# Patient Record
Sex: FEMALE | Race: WHITE | NOT HISPANIC OR LATINO | Employment: FULL TIME | URBAN - METROPOLITAN AREA
[De-identification: names, ages, dates, MRNs, and addresses within clinical notes are randomized per-mention and may not be internally consistent; named-entity substitution may affect disease eponyms.]

---

## 2023-11-28 ASSESSMENT — PROMIS GLOBAL HEALTH SCALE
RATE_GENERAL_HEALTH: GOOD
CARRYOUT_SOCIAL_ACTIVITIES: VERY GOOD
RATE_PHYSICAL_HEALTH: GOOD
RATE_AVERAGE_PAIN: 0
RATE_QUALITY_OF_LIFE: GOOD
RATE_SOCIAL_SATISFACTION: VERY GOOD
EMOTIONAL_PROBLEMS: RARELY
RATE_AVERAGE_FATIGUE: MILD
RATE_MENTAL_HEALTH: GOOD
CARRYOUT_PHYSICAL_ACTIVITIES: COMPLETELY

## 2023-12-04 ENCOUNTER — APPOINTMENT (OUTPATIENT)
Dept: PRIMARY CARE | Facility: CLINIC | Age: 28
End: 2023-12-04
Payer: COMMERCIAL

## 2023-12-04 ENCOUNTER — OFFICE VISIT (OUTPATIENT)
Dept: PRIMARY CARE | Facility: CLINIC | Age: 28
End: 2023-12-04
Payer: COMMERCIAL

## 2023-12-04 VITALS — WEIGHT: 146 LBS | TEMPERATURE: 98.6 F | SYSTOLIC BLOOD PRESSURE: 118 MMHG | DIASTOLIC BLOOD PRESSURE: 74 MMHG

## 2023-12-04 DIAGNOSIS — Z00.00 HEALTH MAINTENANCE EXAMINATION: Primary | ICD-10-CM

## 2023-12-04 DIAGNOSIS — Z91.89 AT HIGH RISK FOR BREAST CANCER: ICD-10-CM

## 2023-12-04 PROCEDURE — 99385 PREV VISIT NEW AGE 18-39: CPT | Performed by: FAMILY MEDICINE

## 2023-12-04 PROCEDURE — 1036F TOBACCO NON-USER: CPT | Performed by: FAMILY MEDICINE

## 2023-12-04 ASSESSMENT — PROMIS GLOBAL HEALTH SCALE
CARRYOUT_SOCIAL_ACTIVITIES: VERY GOOD
RATE_GENERAL_HEALTH: GOOD
RATE_AVERAGE_PAIN: 0
RATE_PHYSICAL_HEALTH: GOOD
EMOTIONAL_PROBLEMS: RARELY
RATE_QUALITY_OF_LIFE: GOOD
RATE_AVERAGE_FATIGUE: MILD
RATE_SOCIAL_SATISFACTION: VERY GOOD
CARRYOUT_PHYSICAL_ACTIVITIES: COMPLETELY
RATE_MENTAL_HEALTH: GOOD

## 2023-12-04 NOTE — PROGRESS NOTES
"Subjective   Patient ID: Xiomara Joy is a 28 y.o. female who presents for New Patient Visit (EP. Here for WWE. No pap or breast exam).  HPI  Feels well, no specific complaints or concerns today.  Change in insurance (now employed with ) so needs to establish with new physicians     Had pap smear 9/2023 ([facility])    Reports that she had bloodwork done in the Summer and \"everything was ok\"     Is a high risk breast cancer patient and reports that she is due for a MRI breast    She has a h/o fibroadenoma    FH: pGM and 2 paternal aunts with breast cancer  Review of Systems  General: no fever or night sweats, no change in weight  Eyes: no vision disturbance  ENT: no mouth lesions, no sore throat, and no dysphagia  CV: no chest pain, no palpitations  Resp: no shortness of breath, no cough  GI: no abdominal pain, no change in bowel habits  : no urinary problems  MSK: no arthralgias, myalgias, or back pain  Skin; no rashes or new/changed skin lesions  Neuro: no headaches        Objective   Visit Vitals  /74   Temp 37 °C (98.6 °F) (Oral)      Physical Exam  Alert, well-appearing.    HEENT: OP clear. Sclera white. Pupils equal and round. EACs and TMs clear.    Neck: Supple. No palpable masses. Thyroid was not enlarged.    CVS: RRR, no murmurs.     Respiratory: Normal inspirations and expirations. Clear and equal breath sounds.    GI: Soft, nontender, no masses or hepatosplenomegaly.     Assessment/Plan   Diagnoses and all orders for this visit:  Health maintenance examination  At high risk for breast cancer  -     Referral to Breast Surgery; Future      Ginny Davenport MD  Family Medicine   [facility]  "

## 2023-12-08 ENCOUNTER — PATIENT MESSAGE (OUTPATIENT)
Dept: PRIMARY CARE | Facility: CLINIC | Age: 28
End: 2023-12-08
Payer: COMMERCIAL

## 2023-12-08 DIAGNOSIS — B00.1 RECURRENT COLD SORES: Primary | ICD-10-CM

## 2023-12-08 RX ORDER — VALACYCLOVIR HYDROCHLORIDE 1 G/1
2000 TABLET, FILM COATED ORAL 2 TIMES DAILY
Qty: 28 TABLET | Refills: 0 | Status: SHIPPED | OUTPATIENT
Start: 2023-12-08 | End: 2023-12-09

## 2024-01-09 ENCOUNTER — APPOINTMENT (OUTPATIENT)
Dept: SURGICAL ONCOLOGY | Facility: HOSPITAL | Age: 29
End: 2024-01-09
Payer: COMMERCIAL

## 2024-01-11 ENCOUNTER — HOSPITAL ENCOUNTER (OUTPATIENT)
Dept: RADIOLOGY | Facility: EXTERNAL LOCATION | Age: 29
Discharge: HOME | End: 2024-01-11

## 2024-01-16 NOTE — PROGRESS NOTES
Xiomara Joy female   1995 28 y.o.   [id number]      Chief Complaint    New Patient Visit          HPI  Xiomara Joy is a 28 y.o.  female [facility] by Ginny Davenport MD [facility]. 3/2021 left breast ultrasound core biopsy at [facility] noted benign fibroadenoma.      BREAST IMAGIN2022 breast MRI, BI-RADS Category 2, bilateral masses consistent with fibroadenomas. 3/21/202 bilateral baseline mammogram, BI-RADS Category 2 bilateral probable benign breast masses.     REPRODUCTIVE HISTORY: menarche age 12, , first birth age 24,  premenopausal, extremely dense breast tissue,  one left benign biopsy with fibroadenoma    FAMILY CANCER HISTORY:   Paternal aunt (1): breast cancer  Paternal aunt (2): breast cancer  Paternal grandmother: breast cancer    REVIEW OF SYSTEMS    Constitutional:  Negative for appetite change, fatigue, fever and unexpected weight change.   HENT:  Negative for ear pain, hearing loss, nosebleeds, sore throat and trouble swallowing.    Eyes:  Negative for discharge, itching and visual disturbance.   Respiratory:  Negative for cough, chest tightness and shortness of breath.    Cardiovascular:  Negative for chest pain, palpitations and leg swelling.   Breast: as indicated in HPI  Gastrointestinal:  Negative for abdominal pain, constipation, diarrhea and nausea.   Endocrine: Negative for cold intolerance and heat intolerance.   Genitourinary:  Negative for dysuria, frequency, hematuria, pelvic pain and vaginal bleeding.   Musculoskeletal:  Negative for arthralgias, back pain, gait problem, joint swelling and myalgias.   Skin:  Negative for color change and rash.   Allergic/Immunologic: Negative for environmental allergies and food allergies.   Neurological:  Negative for dizziness, tremors, speech difficulty, weakness, numbness and headaches.   Hematological:  Does not bruise/bleed easily.   Psychiatric/Behavioral:  Negative for agitation,  dysphoric mood and sleep disturbance. The patient is not nervous/anxious.         MEDICATIONS  Current Outpatient Medications   Medication Instructions    valacyclovir HCl (VALACYCLOVIR ORAL)         ALLERGIES  No Known Allergies     No past medical history on file.   No past surgical history on file.   Family History   Problem Relation Name Age of Onset    No Known Problems Mother      No Known Problems Father      Breast cancer Father's Sister      Breast cancer Father's Sister      Breast cancer Paternal Grandmother            SOCIAL HISTORY      Social History     Tobacco Use    Smoking status: Never     Passive exposure: Never    Smokeless tobacco: Never   Substance Use Topics    Alcohol use: Not on file     Comment: socially        VITALS  Vitals:    01/19/24 0756   BP: 134/86   Pulse: 59        PHYSICAL EXAM  Patient is alert and oriented x3, with appropriate mood. The gait is steady and hand grasps are equal. Sclera clear. The breasts are nearly symmetrical. The tissue is soft   with multiple palpable mobile soft round masses bilaterally.  Right breast at 12:00 3CFN 3cmx 2.5cm, at 9:00 8CFN 1x1cm, at 8:00 4CFN 2x1cm. Left breast at 2:00 6CFN 1x1cm, 9:00 4CFN 3x2.5cm. The skin and nipples appear normal. There is no cervical, supraclavicular, or axillary lymphadenopathy palpable. Heart rate and rhythm normal, S1 and S2 appreciated. The lungs are clear bilaterally. Abdomen is soft & non-tender.    Physical Exam  Chest:              IMAGING      Time was spent viewing digital images of the radiology testing with the patient. I explained the results in depth, along with suggested explanation for follow up recommendations based on the testing results.          ASSESSMENT/PLAN  Bilateral probable benign fibroadenomas      Return age 40 for bilateral mammogram, talk to your family about about genetic testing for your father or aunts,      Sarah Hanley, APRN-[facility]

## 2024-01-19 ENCOUNTER — OFFICE VISIT (OUTPATIENT)
Dept: SURGICAL ONCOLOGY | Facility: CLINIC | Age: 29
End: 2024-01-19
Payer: COMMERCIAL

## 2024-01-19 VITALS
SYSTOLIC BLOOD PRESSURE: 134 MMHG | WEIGHT: 148 LBS | BODY MASS INDEX: 25.27 KG/M2 | DIASTOLIC BLOOD PRESSURE: 86 MMHG | HEIGHT: 64 IN | HEART RATE: 59 BPM

## 2024-01-19 DIAGNOSIS — Z91.89 AT HIGH RISK FOR BREAST CANCER: ICD-10-CM

## 2024-01-19 DIAGNOSIS — N63.20 MASSES OF BOTH BREASTS: Primary | ICD-10-CM

## 2024-01-19 DIAGNOSIS — N63.10 MASSES OF BOTH BREASTS: Primary | ICD-10-CM

## 2024-01-19 PROCEDURE — 99214 OFFICE O/P EST MOD 30 MIN: CPT | Performed by: NURSE PRACTITIONER

## 2024-01-19 PROCEDURE — 1036F TOBACCO NON-USER: CPT | Performed by: NURSE PRACTITIONER

## 2024-01-19 PROCEDURE — 99204 OFFICE O/P NEW MOD 45 MIN: CPT | Performed by: NURSE PRACTITIONER

## 2024-01-19 ASSESSMENT — PAIN SCALES - GENERAL: PAINLEVEL: 0-NO PAIN

## 2024-02-19 DIAGNOSIS — B00.1 RECURRENT COLD SORES: ICD-10-CM

## 2024-02-19 RX ORDER — VALACYCLOVIR HYDROCHLORIDE 1 G/1
2000 TABLET, FILM COATED ORAL 2 TIMES DAILY
Qty: 28 TABLET | Refills: 0 | Status: SHIPPED | OUTPATIENT
Start: 2024-02-19 | End: 2024-02-20

## 2024-03-18 ENCOUNTER — TRANSCRIBE ORDERS (OUTPATIENT)
Dept: OBSTETRICS AND GYNECOLOGY | Facility: CLINIC | Age: 29
End: 2024-03-18
Payer: COMMERCIAL

## 2024-03-18 DIAGNOSIS — N91.2 AMENORRHEA: Primary | ICD-10-CM

## 2024-04-12 ENCOUNTER — LAB (OUTPATIENT)
Dept: LAB | Facility: LAB | Age: 29
End: 2024-04-12
Payer: COMMERCIAL

## 2024-04-13 LAB — COTININE UR QL SCN: NEGATIVE

## 2024-04-15 ENCOUNTER — INITIAL PRENATAL (OUTPATIENT)
Dept: OBSTETRICS AND GYNECOLOGY | Facility: CLINIC | Age: 29
End: 2024-04-15
Payer: COMMERCIAL

## 2024-04-15 ENCOUNTER — HOSPITAL ENCOUNTER (OUTPATIENT)
Dept: RADIOLOGY | Facility: CLINIC | Age: 29
Discharge: HOME | End: 2024-04-15
Payer: COMMERCIAL

## 2024-04-15 VITALS — BODY MASS INDEX: 25.58 KG/M2 | WEIGHT: 149 LBS | SYSTOLIC BLOOD PRESSURE: 104 MMHG | DIASTOLIC BLOOD PRESSURE: 70 MMHG

## 2024-04-15 DIAGNOSIS — N91.2 AMENORRHEA: ICD-10-CM

## 2024-04-15 DIAGNOSIS — Z3A.08 8 WEEKS GESTATION OF PREGNANCY (HHS-HCC): Primary | ICD-10-CM

## 2024-04-15 PROBLEM — Z87.19 HISTORY OF CHOLESTASIS DURING PREGNANCY: Status: ACTIVE | Noted: 2024-04-15

## 2024-04-15 PROBLEM — Z87.59 HISTORY OF CHOLESTASIS DURING PREGNANCY: Status: ACTIVE | Noted: 2024-04-15

## 2024-04-15 LAB
ABO GROUP (TYPE) IN BLOOD: NORMAL
ANTIBODY SCREEN: NORMAL
ERYTHROCYTE [DISTWIDTH] IN BLOOD BY AUTOMATED COUNT: 13.2 % (ref 11.5–14.5)
HCT VFR BLD AUTO: 39.6 % (ref 36–46)
HGB BLD-MCNC: 12.7 G/DL (ref 12–16)
MCH RBC QN AUTO: 28.1 PG (ref 26–34)
MCHC RBC AUTO-ENTMCNC: 32.1 G/DL (ref 32–36)
MCV RBC AUTO: 88 FL (ref 80–100)
NRBC BLD-RTO: 0 /100 WBCS (ref 0–0)
PLATELET # BLD AUTO: 133 X10*3/UL (ref 150–450)
PREGNANCY TEST URINE, POC: POSITIVE
RBC # BLD AUTO: 4.52 X10*6/UL (ref 4–5.2)
RH FACTOR (ANTIGEN D): NORMAL
WBC # BLD AUTO: 8.2 X10*3/UL (ref 4.4–11.3)

## 2024-04-15 PROCEDURE — 76801 OB US < 14 WKS SINGLE FETUS: CPT | Performed by: OBSTETRICS & GYNECOLOGY

## 2024-04-15 PROCEDURE — 86850 RBC ANTIBODY SCREEN: CPT

## 2024-04-15 PROCEDURE — 86901 BLOOD TYPING SEROLOGIC RH(D): CPT

## 2024-04-15 PROCEDURE — 87389 HIV-1 AG W/HIV-1&-2 AB AG IA: CPT

## 2024-04-15 PROCEDURE — 36415 COLL VENOUS BLD VENIPUNCTURE: CPT

## 2024-04-15 PROCEDURE — 85027 COMPLETE CBC AUTOMATED: CPT

## 2024-04-15 PROCEDURE — 86803 HEPATITIS C AB TEST: CPT

## 2024-04-15 PROCEDURE — 87800 DETECT AGNT MULT DNA DIREC: CPT

## 2024-04-15 PROCEDURE — 86780 TREPONEMA PALLIDUM: CPT

## 2024-04-15 PROCEDURE — 88175 CYTOPATH C/V AUTO FLUID REDO: CPT

## 2024-04-15 PROCEDURE — 86900 BLOOD TYPING SEROLOGIC ABO: CPT

## 2024-04-15 PROCEDURE — 87086 URINE CULTURE/COLONY COUNT: CPT

## 2024-04-15 PROCEDURE — 0500F INITIAL PRENATAL CARE VISIT: CPT | Performed by: OBSTETRICS & GYNECOLOGY

## 2024-04-15 PROCEDURE — 81025 URINE PREGNANCY TEST: CPT | Performed by: OBSTETRICS & GYNECOLOGY

## 2024-04-15 PROCEDURE — 87340 HEPATITIS B SURFACE AG IA: CPT

## 2024-04-15 PROCEDURE — 86317 IMMUNOASSAY INFECTIOUS AGENT: CPT

## 2024-04-15 NOTE — PROGRESS NOTES
Subjective   Patient ID [id number]   Xiomara Joy is a 29 y.o.  at Unknown with a working estimated date of delivery of Not found. who presents for an initial prenatal visit. This pregnancy is planned.    Her pregnancy is complicated by:  History of cholestasis with 1st pregnancy -induced at 37 weeks    OB History    Para Term  AB Living   2 1 1     1   SAB IAB Ectopic Multiple Live Births           1      # Outcome Date GA Lbr Higinio/2nd Weight Sex Delivery Anes PTL Lv   2 Current            1 Term 19   2.722 kg F Vag-Spont   REYNOLD          Objective   Physical Exam  Weight: 67.6 kg (149 lb)  Expected Total Weight Gain: Could not be calculated   Pregravid BMI: Could not be calculated  BP: 104/70          OBGyn Exam  EGBUS normal  Lesions  Cervix no lesions    Prenatal Labs  ordered    Assessment/Plan   Problem List Items Addressed This Visit    None  Visit Diagnoses         Codes    Amenorrhea     N91.2    Relevant Orders    POCT pregnancy, urine manually resulted (Completed)    US OB < 14 weeks early    CBC Anemia Panel With Reflex,Pregnancy    Type And Screen    Hepatitis B surface antigen    Hepatitis C antibody    Syphilis Screen with Reflex    Rubella Antibody, IgG    HIV 1/2 Antigen/Antibody Screen with Reflex to Confirmation    THINPREP PAP TEST    Urine Culture    C. Trachomatis / N. Gonorrhoeae, Amplified Detection            Immunizations:   Prenatal Labs ordered  Daily prenatal vitamins prescribed  First trimester screening and second trimester screening discussed. Patient decided to schedule NIPT  D/w pt risk of recurrence of cholestasis  Follow up in 3 weeks for return OB visit.

## 2024-04-16 LAB
C TRACH RRNA SPEC QL NAA+PROBE: NEGATIVE
HBV SURFACE AG SERPL QL IA: NONREACTIVE
HCV AB SER QL: NONREACTIVE
HIV 1+2 AB+HIV1 P24 AG SERPL QL IA: NONREACTIVE
N GONORRHOEA DNA SPEC QL PROBE+SIG AMP: NEGATIVE
RUBV IGG SERPL IA-ACNC: 2.3 IA
RUBV IGG SERPL QL IA: POSITIVE
TREPONEMA PALLIDUM IGG+IGM AB [PRESENCE] IN SERUM OR PLASMA BY IMMUNOASSAY: NONREACTIVE

## 2024-04-17 LAB — BACTERIA UR CULT: NO GROWTH

## 2024-04-18 ENCOUNTER — APPOINTMENT (OUTPATIENT)
Dept: DERMATOLOGY | Facility: CLINIC | Age: 29
End: 2024-04-18
Payer: COMMERCIAL

## 2024-04-22 LAB
CYTOLOGY CMNT CVX/VAG CYTO-IMP: NORMAL
LAB AP HPV GENOTYPE QUESTION: YES
LAB AP HPV HR: NORMAL
LABORATORY COMMENT REPORT: NORMAL
LMP START DATE: NORMAL
PATH REPORT.TOTAL CANCER: NORMAL

## 2024-05-06 ENCOUNTER — ROUTINE PRENATAL (OUTPATIENT)
Dept: OBSTETRICS AND GYNECOLOGY | Facility: CLINIC | Age: 29
End: 2024-05-06
Payer: COMMERCIAL

## 2024-05-06 ENCOUNTER — HOSPITAL ENCOUNTER (OUTPATIENT)
Dept: RADIOLOGY | Facility: CLINIC | Age: 29
Discharge: HOME | End: 2024-05-06
Payer: COMMERCIAL

## 2024-05-06 VITALS — SYSTOLIC BLOOD PRESSURE: 102 MMHG | BODY MASS INDEX: 25.75 KG/M2 | WEIGHT: 150 LBS | DIASTOLIC BLOOD PRESSURE: 64 MMHG

## 2024-05-06 DIAGNOSIS — Z34.81 PRENATAL CARE, SUBSEQUENT PREGNANCY, FIRST TRIMESTER (HHS-HCC): ICD-10-CM

## 2024-05-06 DIAGNOSIS — Z3A.08 8 WEEKS GESTATION OF PREGNANCY (HHS-HCC): ICD-10-CM

## 2024-05-06 PROCEDURE — 0501F PRENATAL FLOW SHEET: CPT | Performed by: OBSTETRICS & GYNECOLOGY

## 2024-05-06 PROCEDURE — 36415 COLL VENOUS BLD VENIPUNCTURE: CPT

## 2024-05-06 PROCEDURE — 76813 OB US NUCHAL MEAS 1 GEST: CPT | Performed by: OBSTETRICS & GYNECOLOGY

## 2024-05-15 ENCOUNTER — TELEPHONE (OUTPATIENT)
Dept: OBSTETRICS AND GYNECOLOGY | Facility: CLINIC | Age: 29
End: 2024-05-15
Payer: COMMERCIAL

## 2024-05-15 NOTE — TELEPHONE ENCOUNTER
Left message notifying patient of negative [facility] Screening Result.    Gricel Alexandra MA

## 2024-06-04 ENCOUNTER — ROUTINE PRENATAL (OUTPATIENT)
Dept: OBSTETRICS AND GYNECOLOGY | Facility: CLINIC | Age: 29
End: 2024-06-04
Payer: COMMERCIAL

## 2024-06-04 VITALS — WEIGHT: 150 LBS | DIASTOLIC BLOOD PRESSURE: 60 MMHG | BODY MASS INDEX: 25.75 KG/M2 | SYSTOLIC BLOOD PRESSURE: 100 MMHG

## 2024-06-04 DIAGNOSIS — Z34.82 PRENATAL CARE, SUBSEQUENT PREGNANCY, SECOND TRIMESTER (HHS-HCC): ICD-10-CM

## 2024-06-04 PROCEDURE — 0501F PRENATAL FLOW SHEET: CPT | Performed by: OBSTETRICS & GYNECOLOGY

## 2024-06-04 NOTE — PROGRESS NOTES
Feeling good  NP eating or drinking  States she did not gain much weight with first pregnancy    Anatomy scan 4 weeks

## 2024-07-01 ENCOUNTER — APPOINTMENT (OUTPATIENT)
Dept: RADIOLOGY | Facility: CLINIC | Age: 29
End: 2024-07-01
Payer: COMMERCIAL

## 2024-07-01 ENCOUNTER — APPOINTMENT (OUTPATIENT)
Dept: OBSTETRICS AND GYNECOLOGY | Facility: CLINIC | Age: 29
End: 2024-07-01
Payer: COMMERCIAL

## 2024-07-01 VITALS — BODY MASS INDEX: 26.26 KG/M2 | WEIGHT: 153 LBS | SYSTOLIC BLOOD PRESSURE: 98 MMHG | DIASTOLIC BLOOD PRESSURE: 58 MMHG

## 2024-07-01 DIAGNOSIS — Z34.82 PRENATAL CARE, SUBSEQUENT PREGNANCY, SECOND TRIMESTER (HHS-HCC): ICD-10-CM

## 2024-07-01 PROCEDURE — 0501F PRENATAL FLOW SHEET: CPT | Performed by: OBSTETRICS & GYNECOLOGY

## 2024-07-17 ENCOUNTER — APPOINTMENT (OUTPATIENT)
Dept: DERMATOLOGY | Facility: CLINIC | Age: 29
End: 2024-07-17
Payer: COMMERCIAL

## 2024-07-30 ENCOUNTER — APPOINTMENT (OUTPATIENT)
Dept: OBSTETRICS AND GYNECOLOGY | Facility: CLINIC | Age: 29
End: 2024-07-30
Payer: COMMERCIAL

## 2024-07-30 VITALS — DIASTOLIC BLOOD PRESSURE: 58 MMHG | WEIGHT: 156 LBS | SYSTOLIC BLOOD PRESSURE: 104 MMHG | BODY MASS INDEX: 26.78 KG/M2

## 2024-07-30 DIAGNOSIS — Z34.82 PRENATAL CARE, SUBSEQUENT PREGNANCY, SECOND TRIMESTER (HHS-HCC): ICD-10-CM

## 2024-07-30 DIAGNOSIS — N76.0 ACUTE VAGINITIS: Primary | ICD-10-CM

## 2024-07-30 PROCEDURE — 87205 SMEAR GRAM STAIN: CPT

## 2024-07-30 PROCEDURE — 0501F PRENATAL FLOW SHEET: CPT | Performed by: OBSTETRICS & GYNECOLOGY

## 2024-07-30 NOTE — PROGRESS NOTES
Complains of postcoital spotting  No cramping or contractions    EGBUS normal  Vagina erythema with chunky discharge  Cervix friable, closed    Gram stain    50 gr 4 weeks

## 2024-07-31 LAB
CLUE CELLS VAG LPF-#/AREA: ABNORMAL /[LPF]
NUGENT SCORE: 1
YEAST VAG WET PREP-#/AREA: PRESENT

## 2024-08-01 DIAGNOSIS — N76.0 ACUTE VAGINITIS: Primary | ICD-10-CM

## 2024-08-01 RX ORDER — TERCONAZOLE 8 MG/G
1 CREAM VAGINAL NIGHTLY
Qty: 20 G | Refills: 0 | Status: SHIPPED | OUTPATIENT
Start: 2024-08-01 | End: 2024-08-04

## 2024-08-27 ENCOUNTER — APPOINTMENT (OUTPATIENT)
Dept: OBSTETRICS AND GYNECOLOGY | Facility: CLINIC | Age: 29
End: 2024-08-27
Payer: COMMERCIAL

## 2024-08-29 DIAGNOSIS — Z34.82 PRENATAL CARE, SUBSEQUENT PREGNANCY, SECOND TRIMESTER (HHS-HCC): Primary | ICD-10-CM

## 2024-08-30 ENCOUNTER — LAB (OUTPATIENT)
Dept: LAB | Facility: LAB | Age: 29
End: 2024-08-30
Payer: COMMERCIAL

## 2024-08-30 ENCOUNTER — ROUTINE PRENATAL (OUTPATIENT)
Dept: OBSTETRICS AND GYNECOLOGY | Facility: CLINIC | Age: 29
End: 2024-08-30
Payer: COMMERCIAL

## 2024-08-30 VITALS — DIASTOLIC BLOOD PRESSURE: 60 MMHG | WEIGHT: 160 LBS | BODY MASS INDEX: 27.46 KG/M2 | SYSTOLIC BLOOD PRESSURE: 100 MMHG

## 2024-08-30 DIAGNOSIS — Z34.83 MULTIGRAVIDA IN THIRD TRIMESTER (HHS-HCC): ICD-10-CM

## 2024-08-30 DIAGNOSIS — Z34.82 PRENATAL CARE, SUBSEQUENT PREGNANCY, SECOND TRIMESTER (HHS-HCC): ICD-10-CM

## 2024-08-30 PROCEDURE — 36415 COLL VENOUS BLD VENIPUNCTURE: CPT

## 2024-08-30 PROCEDURE — 86780 TREPONEMA PALLIDUM: CPT

## 2024-08-30 PROCEDURE — 85018 HEMOGLOBIN: CPT

## 2024-08-30 PROCEDURE — 0501F PRENATAL FLOW SHEET: CPT | Performed by: OBSTETRICS & GYNECOLOGY

## 2024-08-30 PROCEDURE — 82947 ASSAY GLUCOSE BLOOD QUANT: CPT

## 2024-08-31 LAB
GLUCOSE 1H P 50 G GLC PO SERPL-MCNC: 91 MG/DL
HGB BLD-MCNC: 10.2 G/DL (ref 12–16)
TREPONEMA PALLIDUM IGG+IGM AB [PRESENCE] IN SERUM OR PLASMA BY IMMUNOASSAY: NONREACTIVE

## 2024-09-17 ENCOUNTER — APPOINTMENT (OUTPATIENT)
Dept: OBSTETRICS AND GYNECOLOGY | Facility: CLINIC | Age: 29
End: 2024-09-17
Payer: COMMERCIAL

## 2024-09-17 VITALS — WEIGHT: 161 LBS | SYSTOLIC BLOOD PRESSURE: 106 MMHG | DIASTOLIC BLOOD PRESSURE: 56 MMHG | BODY MASS INDEX: 27.64 KG/M2

## 2024-09-17 DIAGNOSIS — Z34.83 MULTIGRAVIDA IN THIRD TRIMESTER (HHS-HCC): ICD-10-CM

## 2024-09-17 PROCEDURE — 90471 IMMUNIZATION ADMIN: CPT | Performed by: OBSTETRICS & GYNECOLOGY

## 2024-09-17 PROCEDURE — 90715 TDAP VACCINE 7 YRS/> IM: CPT | Performed by: OBSTETRICS & GYNECOLOGY

## 2024-09-17 PROCEDURE — 0501F PRENATAL FLOW SHEET: CPT | Performed by: OBSTETRICS & GYNECOLOGY

## 2024-10-01 ENCOUNTER — APPOINTMENT (OUTPATIENT)
Dept: OBSTETRICS AND GYNECOLOGY | Facility: CLINIC | Age: 29
End: 2024-10-01
Payer: COMMERCIAL

## 2024-10-01 VITALS — DIASTOLIC BLOOD PRESSURE: 60 MMHG | SYSTOLIC BLOOD PRESSURE: 102 MMHG | WEIGHT: 163.7 LBS | BODY MASS INDEX: 28.1 KG/M2

## 2024-10-01 DIAGNOSIS — Z34.83 MULTIGRAVIDA IN THIRD TRIMESTER (HHS-HCC): ICD-10-CM

## 2024-10-01 PROCEDURE — 0501F PRENATAL FLOW SHEET: CPT | Performed by: OBSTETRICS & GYNECOLOGY

## 2024-10-11 ENCOUNTER — APPOINTMENT (OUTPATIENT)
Dept: DERMATOLOGY | Facility: CLINIC | Age: 29
End: 2024-10-11
Payer: COMMERCIAL

## 2024-10-11 DIAGNOSIS — D18.01 HEMANGIOMA OF SKIN: ICD-10-CM

## 2024-10-11 DIAGNOSIS — Z12.83 ENCOUNTER FOR SCREENING FOR MALIGNANT NEOPLASM OF SKIN: Primary | ICD-10-CM

## 2024-10-11 DIAGNOSIS — L81.4 LENTIGO: ICD-10-CM

## 2024-10-11 DIAGNOSIS — D22.9 MELANOCYTIC NEVUS, UNSPECIFIED LOCATION: ICD-10-CM

## 2024-10-11 DIAGNOSIS — L70.0 ACNE VULGARIS: ICD-10-CM

## 2024-10-11 PROCEDURE — 1036F TOBACCO NON-USER: CPT | Performed by: NURSE PRACTITIONER

## 2024-10-11 PROCEDURE — 99204 OFFICE O/P NEW MOD 45 MIN: CPT | Performed by: NURSE PRACTITIONER

## 2024-10-11 RX ORDER — TRETINOIN 0.25 MG/G
CREAM TOPICAL
Qty: 45 G | Refills: 8 | Status: SHIPPED | OUTPATIENT
Start: 2024-10-11

## 2024-10-11 NOTE — PROGRESS NOTES
Subjective     Xiomara Joy is a 29 y.o. female who presents for the following: Skin Check (Presents for FBSE. Denies personal or family hx of skin cancer. Pt has lesion of concern on her back. Pt currently 34 weeks pregnant. ).     Review of Systems:  No other skin or systemic complaints other than what is documented elsewhere in the note.    The following portions of the chart were reviewed this encounter and updated as appropriate:  Tobacco  Allergies  Meds  Problems  Med Hx  Surg Hx  Fam Hx       Skin Cancer History  No skin cancer on file.    Specialty Problems    None    Past Medical History:  Xiomara Joy  has a past medical history of Cholestasis during pregnancy.    Past Surgical History:  Xiomara Joy  has a past surgical history that includes Breast biopsy (Left, 02/26/2021) and [place] tooth extraction.    Family History:  Patient family history includes Breast cancer in her father's sister, father's sister, and paternal grandmother; No Known Problems in her father and mother.    Social History:  Xiomara Joy  reports that she has never smoked. She has never been exposed to tobacco smoke. She has never used smokeless tobacco. She reports that she does not use drugs. No history on file for alcohol use.    Allergies:  Patient has no known allergies.    Current Medications / CAM's:    Current Outpatient Medications:     tretinoin (Retin-A) 0.025 % cream, Apply a pea-size amount to entire face at bedtime. Decrease use if too drying., Disp: 45 g, Rfl: 8    valacyclovir HCl (VALACYCLOVIR ORAL), , Disp: , Rfl:      Objective   Well appearing patient in no apparent distress; mood and affect are within normal limits.    A focused skin examination was performed. All findings within normal limits unless otherwise noted below.    Assessment/Plan   1. Encounter for screening for malignant neoplasm of skin  Scattered benign lesions    - Protective measures, such as avoiding skin exposure to  sunlight during peak sun hours (10 AM to 3 PM), wearing protective clothing, and applying high-SPF sunscreen, are essential for reducing exposure to harmful ultraviolet (UV) light.  - Monthly self-examination of the skin is helpful to detect new lesions or changes in existing lesions.  - Discussed signs and symptoms of sun-related skin cancers.   - Make sure your moles are not signs of skin cancer (melanoma). Remember the ABCDEs of melanoma lesions:  A - Asymmetry: One half of the lesion does not mirror the other half.  B - Border: The borders are irregular or vague (indistinct).  C - Color: More than one color may be noted within the mole.  D - Diameter: Size greater than 6 mm (roughly the size of a pencil eraser) may be concerning.  E - Evolving: Notable changes in the lesion over time are suspicious signs for skin cancer.    2. Melanocytic nevus, unspecified location  Uniform pigmented macule(s)/papule(s) with reassuring findings on dermoscopy    -Discussed nature of condition  -Reassurance, benign-appearing features on examination today  -Recommend continued observation    3. Hemangioma of skin  Violaceous/red papule with maroon lagoons     - A cherry hemangioma is a small macule (small, flat, smooth area) or papule (small, solid bump) formed from an overgrowth of tiny blood vessels in the skin. Cherry hemangiomas are characteristically red or purplish in color. They often first appear in middle adulthood and usually increase in number with age. Cherry hemangiomas are noncancerous (benign) and are common in adults.  - Lesions are benign, reassured patient.     4. Lentigo  Scattered tan macules in sun-exposed areas.    A solar lentigo (plural, solar lentigines), sometimes called an age spot or liver spot, is a brown macule (small, flat, smooth area of skin) caused by chronic sun or artificial ultraviolet (UV) light exposure. There may be just one lentigo or there may be multiple. This type of lentigo is different  from lentigo simplex (discussed separately) because it is caused by exposure to UV light. Solar lentigines are benign, but they do indicate excessive sun exposure, a risk factor for the development of skin cancer.  Lesions are benign, no treatment needed.     5. Acne vulgaris  Head - Anterior (Face)  Scattered open comedones    -Discussed the nature of the condition  -Discussed treatment options  -Recommend to begin topical retinoids; if just starting therapy with retinoid, start application of a very thin layer three nights per week as tolerated. If extreme redness or dryness occurs, hold medication until resolved and then restart at a greater interval. May apply moisturizer after application of retinoid. Advance toward nightly use as tolerated  -Recommend: Start tretinoin cream once baby is born, use as directed.   - Risks, benefits, and side effects discussed. Patient understood and agrees with the plan.       Related Medications  tretinoin (Retin-A) 0.025 % cream  Apply a pea-size amount to entire face at bedtime. Decrease use if too drying.      Follow up in 2 years for a total body skin exam. Please call me if there are any changes or development of concerning symptoms (lesion/skin condition is changing, bleeding, enlarging, or worsening).

## 2024-10-15 ENCOUNTER — LAB (OUTPATIENT)
Dept: LAB | Facility: LAB | Age: 29
End: 2024-10-15
Payer: COMMERCIAL

## 2024-10-15 ENCOUNTER — APPOINTMENT (OUTPATIENT)
Dept: OBSTETRICS AND GYNECOLOGY | Facility: CLINIC | Age: 29
End: 2024-10-15
Payer: COMMERCIAL

## 2024-10-15 VITALS — SYSTOLIC BLOOD PRESSURE: 108 MMHG | BODY MASS INDEX: 27.64 KG/M2 | DIASTOLIC BLOOD PRESSURE: 64 MMHG | WEIGHT: 161 LBS

## 2024-10-15 DIAGNOSIS — Z34.83 MULTIGRAVIDA IN THIRD TRIMESTER (HHS-HCC): ICD-10-CM

## 2024-10-15 DIAGNOSIS — N76.0 ACUTE VAGINITIS: Primary | ICD-10-CM

## 2024-10-15 DIAGNOSIS — O36.5991 POOR FETAL GROWTH AFFECTING MANAGEMENT OF MOTHER, ANTEPARTUM, FETUS 1 OF MULTIPLE GESTATION (HHS-HCC): ICD-10-CM

## 2024-10-15 DIAGNOSIS — O99.713 PRURITUS OF PREGNANCY IN THIRD TRIMESTER (HHS-HCC): ICD-10-CM

## 2024-10-15 DIAGNOSIS — L29.9 PRURITUS OF PREGNANCY IN THIRD TRIMESTER (HHS-HCC): ICD-10-CM

## 2024-10-15 PROBLEM — N63.10 MASSES OF BOTH BREASTS: Status: RESOLVED | Noted: 2024-01-19 | Resolved: 2024-10-15

## 2024-10-15 PROBLEM — N63.20 MASSES OF BOTH BREASTS: Status: RESOLVED | Noted: 2024-01-19 | Resolved: 2024-10-15

## 2024-10-15 PROCEDURE — 36415 COLL VENOUS BLD VENIPUNCTURE: CPT

## 2024-10-15 PROCEDURE — 87205 SMEAR GRAM STAIN: CPT

## 2024-10-15 PROCEDURE — 0501F PRENATAL FLOW SHEET: CPT | Performed by: OBSTETRICS & GYNECOLOGY

## 2024-10-15 PROCEDURE — 82239 BILE ACIDS TOTAL: CPT

## 2024-10-15 PROCEDURE — 87081 CULTURE SCREEN ONLY: CPT

## 2024-10-15 NOTE — PROGRESS NOTES
Complains of copious vaginal discharge  Having some itching but not as bad as when diagnosed with cholestasis    Physical exam  Vagina diffuse erythema and copious yellow-white vaginal discharge    GBS today  Bile acids  Gram stain    Ultrasound 1 week    Problem List Items Addressed This Visit    None  Visit Diagnoses       Acute vaginitis    -  Primary    Relevant Orders    Vaginitis Gram Stain For Bacterial Vaginosis + Yeast    Multigravida in third trimester ([facility])        Relevant Orders    Group B Streptococcus (GBS) Prenatal Screen, Culture    Pruritus of pregnancy in third trimester ([facility])        Relevant Orders    Bile Acids, Total    Poor fetal growth affecting management of mother, antepartum, fetus 1 of multiple gestation ([facility])        Relevant Orders    US OB 14+ weeks anatomy scan

## 2024-10-16 DIAGNOSIS — N76.0 ACUTE VAGINITIS: Primary | ICD-10-CM

## 2024-10-16 LAB
CLUE CELLS VAG LPF-#/AREA: ABNORMAL /[LPF]
NUGENT SCORE: 0
YEAST VAG WET PREP-#/AREA: PRESENT

## 2024-10-16 RX ORDER — TERCONAZOLE 8 MG/G
1 CREAM VAGINAL NIGHTLY
Qty: 20 G | Refills: 0 | Status: SHIPPED | OUTPATIENT
Start: 2024-10-16 | End: 2024-10-19

## 2024-10-17 LAB — GP B STREP GENITAL QL CULT: NORMAL

## 2024-10-18 LAB — BILE AC SERPL-SCNC: <1 UMOL/L (ref 0–10)

## 2024-10-23 RX ORDER — TERCONAZOLE 8 MG/G
CREAM VAGINAL
COMMUNITY
Start: 2024-10-16

## 2024-10-24 ENCOUNTER — APPOINTMENT (OUTPATIENT)
Dept: OBSTETRICS AND GYNECOLOGY | Facility: CLINIC | Age: 29
End: 2024-10-24
Payer: COMMERCIAL

## 2024-10-24 ENCOUNTER — HOSPITAL ENCOUNTER (OUTPATIENT)
Dept: RADIOLOGY | Facility: CLINIC | Age: 29
Discharge: HOME | End: 2024-10-24
Payer: COMMERCIAL

## 2024-10-24 VITALS — DIASTOLIC BLOOD PRESSURE: 68 MMHG | SYSTOLIC BLOOD PRESSURE: 110 MMHG | BODY MASS INDEX: 27.64 KG/M2 | WEIGHT: 161 LBS

## 2024-10-24 DIAGNOSIS — Z87.59 HISTORY OF CHOLESTASIS DURING PREGNANCY: Primary | ICD-10-CM

## 2024-10-24 DIAGNOSIS — Z34.83 MULTIGRAVIDA IN THIRD TRIMESTER (HHS-HCC): ICD-10-CM

## 2024-10-24 DIAGNOSIS — O36.5991 POOR FETAL GROWTH AFFECTING MANAGEMENT OF MOTHER, ANTEPARTUM, FETUS 1 OF MULTIPLE GESTATION (HHS-HCC): ICD-10-CM

## 2024-10-24 DIAGNOSIS — Z87.19 HISTORY OF CHOLESTASIS DURING PREGNANCY: Primary | ICD-10-CM

## 2024-10-24 PROCEDURE — 0501F PRENATAL FLOW SHEET: CPT | Performed by: OBSTETRICS & GYNECOLOGY

## 2024-10-24 PROCEDURE — 76819 FETAL BIOPHYS PROFIL W/O NST: CPT | Performed by: OBSTETRICS & GYNECOLOGY

## 2024-10-24 PROCEDURE — 76815 OB US LIMITED FETUS(S): CPT | Performed by: OBSTETRICS & GYNECOLOGY

## 2024-10-24 NOTE — PROGRESS NOTES
Problem List Items Addressed This Visit       History of cholestasis during pregnancy - Primary    Overview     Induced at 37 weeks with first pregnancy due to cholestasis  Delivered in [place]  10/15 - slight itching. Check bile acids          Other Visit Diagnoses       Multigravida in third trimester ([facility])

## 2024-10-31 ENCOUNTER — APPOINTMENT (OUTPATIENT)
Dept: OBSTETRICS AND GYNECOLOGY | Facility: CLINIC | Age: 29
End: 2024-10-31
Payer: COMMERCIAL

## 2024-10-31 VITALS — DIASTOLIC BLOOD PRESSURE: 60 MMHG | SYSTOLIC BLOOD PRESSURE: 110 MMHG | BODY MASS INDEX: 27.81 KG/M2 | WEIGHT: 162 LBS

## 2024-10-31 DIAGNOSIS — O36.5991 POOR FETAL GROWTH AFFECTING MANAGEMENT OF MOTHER, ANTEPARTUM, FETUS 1 OF MULTIPLE GESTATION (HHS-HCC): Primary | ICD-10-CM

## 2024-10-31 DIAGNOSIS — Z87.59 HISTORY OF CHOLESTASIS DURING PREGNANCY: ICD-10-CM

## 2024-10-31 DIAGNOSIS — Z34.83 MULTIGRAVIDA IN THIRD TRIMESTER (HHS-HCC): ICD-10-CM

## 2024-10-31 DIAGNOSIS — Z87.19 HISTORY OF CHOLESTASIS DURING PREGNANCY: ICD-10-CM

## 2024-10-31 LAB
SCAN RESULT: NORMAL
SCAN RESULT: NORMAL

## 2024-10-31 PROCEDURE — 0501F PRENATAL FLOW SHEET: CPT | Performed by: OBSTETRICS & GYNECOLOGY

## 2024-11-05 ENCOUNTER — APPOINTMENT (OUTPATIENT)
Dept: OBSTETRICS AND GYNECOLOGY | Facility: CLINIC | Age: 29
End: 2024-11-05
Payer: COMMERCIAL

## 2024-11-05 VITALS — SYSTOLIC BLOOD PRESSURE: 98 MMHG | DIASTOLIC BLOOD PRESSURE: 60 MMHG | WEIGHT: 163 LBS | BODY MASS INDEX: 27.98 KG/M2

## 2024-11-05 DIAGNOSIS — Z34.83 MULTIGRAVIDA IN THIRD TRIMESTER (HHS-HCC): ICD-10-CM

## 2024-11-05 DIAGNOSIS — Z87.19 HISTORY OF CHOLESTASIS DURING PREGNANCY: Primary | ICD-10-CM

## 2024-11-05 DIAGNOSIS — Z87.59 HISTORY OF CHOLESTASIS DURING PREGNANCY: Primary | ICD-10-CM

## 2024-11-05 PROCEDURE — 0501F PRENATAL FLOW SHEET: CPT | Performed by: OBSTETRICS & GYNECOLOGY

## 2024-11-05 NOTE — PROGRESS NOTES
Problem List Items Addressed This Visit       History of cholestasis during pregnancy - Primary    Overview     Induced at 37 weeks with first pregnancy due to cholestasis  Delivered in [place]  10/15 - slight itching. Check bile acids          Other Visit Diagnoses       Multigravida in third trimester ([facility])

## 2024-11-12 ENCOUNTER — LAB (OUTPATIENT)
Dept: LAB | Facility: LAB | Age: 29
End: 2024-11-12
Payer: COMMERCIAL

## 2024-11-12 ENCOUNTER — APPOINTMENT (OUTPATIENT)
Dept: OBSTETRICS AND GYNECOLOGY | Facility: CLINIC | Age: 29
End: 2024-11-12
Payer: COMMERCIAL

## 2024-11-12 VITALS — SYSTOLIC BLOOD PRESSURE: 104 MMHG | WEIGHT: 161 LBS | BODY MASS INDEX: 27.64 KG/M2 | DIASTOLIC BLOOD PRESSURE: 60 MMHG

## 2024-11-12 DIAGNOSIS — Z34.83 MULTIGRAVIDA IN THIRD TRIMESTER (HHS-HCC): ICD-10-CM

## 2024-11-12 DIAGNOSIS — Z87.19 HISTORY OF CHOLESTASIS DURING PREGNANCY: ICD-10-CM

## 2024-11-12 DIAGNOSIS — O99.713 PRURITUS OF PREGNANCY IN THIRD TRIMESTER (HHS-HCC): ICD-10-CM

## 2024-11-12 DIAGNOSIS — Z87.19 HISTORY OF CHOLESTASIS DURING PREGNANCY: Primary | ICD-10-CM

## 2024-11-12 DIAGNOSIS — L29.9 PRURITUS OF PREGNANCY IN THIRD TRIMESTER (HHS-HCC): ICD-10-CM

## 2024-11-12 DIAGNOSIS — Z87.59 HISTORY OF CHOLESTASIS DURING PREGNANCY: Primary | ICD-10-CM

## 2024-11-12 DIAGNOSIS — Z87.59 HISTORY OF CHOLESTASIS DURING PREGNANCY: ICD-10-CM

## 2024-11-12 PROCEDURE — 0501F PRENATAL FLOW SHEET: CPT | Performed by: OBSTETRICS & GYNECOLOGY

## 2024-11-12 PROCEDURE — 36415 COLL VENOUS BLD VENIPUNCTURE: CPT

## 2024-11-12 PROCEDURE — 82239 BILE ACIDS TOTAL: CPT

## 2024-11-12 NOTE — PROGRESS NOTES
Problem List Items Addressed This Visit       History of cholestasis during pregnancy - Primary    Overview     Induced at 37 weeks with first pregnancy due to cholestasis  Delivered in [place]  10/15 - slight itching. Check bile acids         Relevant Orders    Bile Acids, Total     Other Visit Diagnoses       Multigravida in third trimester ([facility])        Relevant Orders    Bile Acids, Total    Pruritus of pregnancy in third trimester ([facility])        Relevant Orders    Bile Acids, Total

## 2024-11-15 LAB — BILE AC SERPL-SCNC: 1 UMOL/L (ref 0–10)

## 2024-11-22 ENCOUNTER — APPOINTMENT (OUTPATIENT)
Dept: OBSTETRICS AND GYNECOLOGY | Facility: CLINIC | Age: 29
End: 2024-11-22
Payer: COMMERCIAL

## 2024-11-22 ENCOUNTER — APPOINTMENT (OUTPATIENT)
Dept: RADIOLOGY | Facility: CLINIC | Age: 29
End: 2024-11-22
Payer: COMMERCIAL

## 2024-11-22 VITALS — DIASTOLIC BLOOD PRESSURE: 58 MMHG | SYSTOLIC BLOOD PRESSURE: 102 MMHG | BODY MASS INDEX: 28.67 KG/M2 | WEIGHT: 167 LBS

## 2024-11-22 DIAGNOSIS — O36.5991 POOR FETAL GROWTH AFFECTING MANAGEMENT OF MOTHER, ANTEPARTUM, FETUS 1 OF MULTIPLE GESTATION (HHS-HCC): ICD-10-CM

## 2024-11-22 DIAGNOSIS — Z34.83 MULTIGRAVIDA IN THIRD TRIMESTER (HHS-HCC): ICD-10-CM

## 2024-11-22 DIAGNOSIS — O48.0 POST-TERM PREGNANCY, 40-42 WEEKS OF GESTATION (HHS-HCC): ICD-10-CM

## 2024-11-22 PROCEDURE — 76818 FETAL BIOPHYS PROFILE W/NST: CPT | Performed by: OBSTETRICS & GYNECOLOGY

## 2024-11-22 PROCEDURE — 0501F PRENATAL FLOW SHEET: CPT | Performed by: OBSTETRICS & GYNECOLOGY

## 2024-11-22 NOTE — PROGRESS NOTES
Problem List Items Addressed This Visit       Post-term pregnancy, 40-42 weeks of gestation ([facility])    Overview     Plan induction at 41W - declines membrane stripping or earlier induction

## 2024-11-22 NOTE — PROCEDURES
Xiomara Joy, a  at 40w3d with an SOLOMON of 2024, by Last Menstrual Period, was seen at [facility] for a biophysical profile with nonstress test.            Biophysical Profile  Fetal Breathin  Fetal Movement: 2  Fetal Tone: 2  Fluid Volume: 2  Biophysical Profile Score (of 8): 8 /8

## 2024-11-25 ENCOUNTER — HOSPITAL ENCOUNTER (INPATIENT)
Facility: HOSPITAL | Age: 29
LOS: 2 days | Discharge: HOME | End: 2024-11-27
Attending: OBSTETRICS & GYNECOLOGY | Admitting: ADVANCED PRACTICE MIDWIFE
Payer: COMMERCIAL

## 2024-11-25 ENCOUNTER — APPOINTMENT (OUTPATIENT)
Dept: OBSTETRICS AND GYNECOLOGY | Facility: HOSPITAL | Age: 29
End: 2024-11-25
Payer: COMMERCIAL

## 2024-11-25 DIAGNOSIS — O48.0 POST-TERM PREGNANCY, 40-42 WEEKS OF GESTATION (HHS-HCC): ICD-10-CM

## 2024-11-25 PROBLEM — B00.9 HERPES SIMPLEX VIRUS (HSV) INFECTION: Status: ACTIVE | Noted: 2024-11-25

## 2024-11-25 PROBLEM — B00.9 HSV INFECTION: Status: ACTIVE | Noted: 2024-11-25

## 2024-11-25 LAB
ABO GROUP (TYPE) IN BLOOD: NORMAL
ABO GROUP (TYPE) IN BLOOD: NORMAL
ALBUMIN SERPL BCP-MCNC: 3.6 G/DL (ref 3.4–5)
ALP SERPL-CCNC: 127 U/L (ref 33–110)
ALT SERPL W P-5'-P-CCNC: 14 U/L (ref 7–45)
ANION GAP SERPL CALC-SCNC: 13 MMOL/L (ref 10–20)
ANTIBODY SCREEN: NORMAL
AST SERPL W P-5'-P-CCNC: 19 U/L (ref 9–39)
BILIRUB SERPL-MCNC: 0.4 MG/DL (ref 0–1.2)
BUN SERPL-MCNC: 13 MG/DL (ref 6–23)
CALCIUM SERPL-MCNC: 8.9 MG/DL (ref 8.6–10.3)
CHLORIDE SERPL-SCNC: 103 MMOL/L (ref 98–107)
CO2 SERPL-SCNC: 22 MMOL/L (ref 21–32)
CREAT SERPL-MCNC: 0.62 MG/DL (ref 0.5–1.05)
EGFRCR SERPLBLD CKD-EPI 2021: >90 ML/MIN/1.73M*2
ERYTHROCYTE [DISTWIDTH] IN BLOOD BY AUTOMATED COUNT: 13.5 % (ref 11.5–14.5)
GLUCOSE SERPL-MCNC: 96 MG/DL (ref 74–99)
HCT VFR BLD AUTO: 35.9 % (ref 36–46)
HGB BLD-MCNC: 12 G/DL (ref 12–16)
MCH RBC QN AUTO: 28.8 PG (ref 26–34)
MCHC RBC AUTO-ENTMCNC: 33.4 G/DL (ref 32–36)
MCV RBC AUTO: 86 FL (ref 80–100)
NRBC BLD-RTO: 0 /100 WBCS (ref 0–0)
PLATELET # BLD AUTO: 172 X10*3/UL (ref 150–450)
POTASSIUM SERPL-SCNC: 3.9 MMOL/L (ref 3.5–5.3)
PROT SERPL-MCNC: 6 G/DL (ref 6.4–8.2)
RBC # BLD AUTO: 4.17 X10*6/UL (ref 4–5.2)
RH FACTOR (ANTIGEN D): NORMAL
RH FACTOR (ANTIGEN D): NORMAL
SODIUM SERPL-SCNC: 134 MMOL/L (ref 136–145)
WBC # BLD AUTO: 8.1 X10*3/UL (ref 4.4–11.3)

## 2024-11-25 PROCEDURE — 87081 CULTURE SCREEN ONLY: CPT | Mod: AHULAB | Performed by: ADVANCED PRACTICE MIDWIFE

## 2024-11-25 PROCEDURE — 2500000001 HC RX 250 WO HCPCS SELF ADMINISTERED DRUGS (ALT 637 FOR MEDICARE OP): Performed by: ADVANCED PRACTICE MIDWIFE

## 2024-11-25 PROCEDURE — 1120000001 HC OB PRIVATE ROOM DAILY

## 2024-11-25 PROCEDURE — 36415 COLL VENOUS BLD VENIPUNCTURE: CPT | Performed by: ADVANCED PRACTICE MIDWIFE

## 2024-11-25 PROCEDURE — 80053 COMPREHEN METABOLIC PANEL: CPT | Performed by: ADVANCED PRACTICE MIDWIFE

## 2024-11-25 PROCEDURE — 85027 COMPLETE CBC AUTOMATED: CPT | Performed by: ADVANCED PRACTICE MIDWIFE

## 2024-11-25 PROCEDURE — 86901 BLOOD TYPING SEROLOGIC RH(D): CPT | Performed by: ADVANCED PRACTICE MIDWIFE

## 2024-11-25 PROCEDURE — 86780 TREPONEMA PALLIDUM: CPT | Mod: AHULAB | Performed by: ADVANCED PRACTICE MIDWIFE

## 2024-11-25 RX ORDER — OXYTOCIN 10 [USP'U]/ML
10 INJECTION, SOLUTION INTRAMUSCULAR; INTRAVENOUS ONCE AS NEEDED
Status: COMPLETED | OUTPATIENT
Start: 2024-11-25 | End: 2024-11-26

## 2024-11-25 RX ORDER — OXYTOCIN 10 [USP'U]/ML
10 INJECTION, SOLUTION INTRAMUSCULAR; INTRAVENOUS ONCE AS NEEDED
Status: DISCONTINUED | OUTPATIENT
Start: 2024-11-25 | End: 2024-11-26

## 2024-11-25 RX ORDER — LOPERAMIDE HYDROCHLORIDE 2 MG/1
4 CAPSULE ORAL EVERY 2 HOUR PRN
Status: DISCONTINUED | OUTPATIENT
Start: 2024-11-25 | End: 2024-11-26

## 2024-11-25 RX ORDER — LABETALOL HYDROCHLORIDE 5 MG/ML
20 INJECTION, SOLUTION INTRAVENOUS ONCE AS NEEDED
Status: DISCONTINUED | OUTPATIENT
Start: 2024-11-25 | End: 2024-11-26

## 2024-11-25 RX ORDER — METOCLOPRAMIDE 10 MG/1
10 TABLET ORAL EVERY 6 HOURS PRN
Status: DISCONTINUED | OUTPATIENT
Start: 2024-11-25 | End: 2024-11-26

## 2024-11-25 RX ORDER — CARBOPROST TROMETHAMINE 250 UG/ML
250 INJECTION, SOLUTION INTRAMUSCULAR ONCE AS NEEDED
Status: DISCONTINUED | OUTPATIENT
Start: 2024-11-25 | End: 2024-11-26

## 2024-11-25 RX ORDER — LIDOCAINE HYDROCHLORIDE 10 MG/ML
30 INJECTION, SOLUTION INFILTRATION; PERINEURAL ONCE AS NEEDED
Status: DISCONTINUED | OUTPATIENT
Start: 2024-11-25 | End: 2024-11-26

## 2024-11-25 RX ORDER — OXYTOCIN/0.9 % SODIUM CHLORIDE 30/500 ML
60 PLASTIC BAG, INJECTION (ML) INTRAVENOUS ONCE AS NEEDED
Status: DISCONTINUED | OUTPATIENT
Start: 2024-11-25 | End: 2024-11-26

## 2024-11-25 RX ORDER — TERBUTALINE SULFATE 1 MG/ML
0.25 INJECTION SUBCUTANEOUS ONCE AS NEEDED
Status: DISCONTINUED | OUTPATIENT
Start: 2024-11-25 | End: 2024-11-26

## 2024-11-25 RX ORDER — VALACYCLOVIR HYDROCHLORIDE 500 MG/1
500 TABLET, FILM COATED ORAL 2 TIMES DAILY
Status: DISCONTINUED | OUTPATIENT
Start: 2024-11-25 | End: 2024-11-25

## 2024-11-25 RX ORDER — HYDRALAZINE HYDROCHLORIDE 20 MG/ML
5 INJECTION INTRAMUSCULAR; INTRAVENOUS ONCE AS NEEDED
Status: DISCONTINUED | OUTPATIENT
Start: 2024-11-25 | End: 2024-11-26

## 2024-11-25 RX ORDER — TRANEXAMIC ACID 100 MG/ML
1000 INJECTION, SOLUTION INTRAVENOUS ONCE AS NEEDED
Status: DISCONTINUED | OUTPATIENT
Start: 2024-11-25 | End: 2024-11-26

## 2024-11-25 RX ORDER — METHYLERGONOVINE MALEATE 0.2 MG/ML
0.2 INJECTION INTRAVENOUS ONCE AS NEEDED
Status: DISCONTINUED | OUTPATIENT
Start: 2024-11-25 | End: 2024-11-26

## 2024-11-25 RX ORDER — METOCLOPRAMIDE HYDROCHLORIDE 5 MG/ML
10 INJECTION INTRAMUSCULAR; INTRAVENOUS EVERY 6 HOURS PRN
Status: DISCONTINUED | OUTPATIENT
Start: 2024-11-25 | End: 2024-11-26

## 2024-11-25 RX ORDER — MISOPROSTOL 200 UG/1
800 TABLET ORAL ONCE AS NEEDED
Status: DISCONTINUED | OUTPATIENT
Start: 2024-11-25 | End: 2024-11-26

## 2024-11-25 RX ORDER — ONDANSETRON HYDROCHLORIDE 2 MG/ML
4 INJECTION, SOLUTION INTRAVENOUS EVERY 6 HOURS PRN
Status: DISCONTINUED | OUTPATIENT
Start: 2024-11-25 | End: 2024-11-26

## 2024-11-25 RX ORDER — NIFEDIPINE 10 MG/1
10 CAPSULE ORAL ONCE AS NEEDED
Status: DISCONTINUED | OUTPATIENT
Start: 2024-11-25 | End: 2024-11-26

## 2024-11-25 RX ORDER — ONDANSETRON 4 MG/1
4 TABLET, FILM COATED ORAL EVERY 6 HOURS PRN
Status: DISCONTINUED | OUTPATIENT
Start: 2024-11-25 | End: 2024-11-26

## 2024-11-25 SDOH — HEALTH STABILITY: MENTAL HEALTH: HAVE YOU USED ANY SUBSTANCES (CANABIS, COCAINE, HEROIN, HALLUCINOGENS, INHALANTS, ETC.) IN THE PAST 12 MONTHS?: NO

## 2024-11-25 SDOH — SOCIAL STABILITY: SOCIAL INSECURITY: DO YOU FEEL ANYONE HAS EXPLOITED OR TAKEN ADVANTAGE OF YOU FINANCIALLY OR OF YOUR PERSONAL PROPERTY?: NO

## 2024-11-25 SDOH — SOCIAL STABILITY: SOCIAL INSECURITY
WITHIN THE LAST YEAR, HAVE YOU BEEN KICKED, HIT, SLAPPED, OR OTHERWISE PHYSICALLY HURT BY YOUR PARTNER OR EX-PARTNER?: NO

## 2024-11-25 SDOH — SOCIAL STABILITY: SOCIAL INSECURITY: ARE THERE ANY APPARENT SIGNS OF INJURIES/BEHAVIORS THAT COULD BE RELATED TO ABUSE/NEGLECT?: NO

## 2024-11-25 SDOH — SOCIAL STABILITY: SOCIAL INSECURITY
WITHIN THE LAST YEAR, HAVE YOU BEEN RAPED OR FORCED TO HAVE ANY KIND OF SEXUAL ACTIVITY BY YOUR PARTNER OR EX-PARTNER?: NO

## 2024-11-25 SDOH — HEALTH STABILITY: MENTAL HEALTH: WISH TO BE DEAD (PAST 1 MONTH): NO

## 2024-11-25 SDOH — SOCIAL STABILITY: SOCIAL INSECURITY: HAS ANYONE EVER THREATENED TO HURT YOUR FAMILY OR YOUR PETS?: NO

## 2024-11-25 SDOH — SOCIAL STABILITY: SOCIAL INSECURITY: PHYSICAL ABUSE: DENIES

## 2024-11-25 SDOH — SOCIAL STABILITY: SOCIAL INSECURITY: ARE YOU OR HAVE YOU BEEN THREATENED OR ABUSED PHYSICALLY, EMOTIONALLY, OR SEXUALLY BY ANYONE?: NO

## 2024-11-25 SDOH — ECONOMIC STABILITY: HOUSING INSECURITY: DO YOU FEEL UNSAFE GOING BACK TO THE PLACE WHERE YOU ARE LIVING?: NO

## 2024-11-25 SDOH — ECONOMIC STABILITY: FOOD INSECURITY: WITHIN THE PAST 12 MONTHS, THE FOOD YOU BOUGHT JUST DIDN'T LAST AND YOU DIDN'T HAVE MONEY TO GET MORE.: NEVER TRUE

## 2024-11-25 SDOH — SOCIAL STABILITY: SOCIAL INSECURITY: ABUSE SCREEN: ADULT

## 2024-11-25 SDOH — ECONOMIC STABILITY: TRANSPORTATION INSECURITY: IN THE PAST 12 MONTHS, HAS LACK OF TRANSPORTATION KEPT YOU FROM MEDICAL APPOINTMENTS OR FROM GETTING MEDICATIONS?: NO

## 2024-11-25 SDOH — HEALTH STABILITY: MENTAL HEALTH: HAVE YOU USED ANY PRESCRIPTION DRUGS OTHER THAN PRESCRIBED IN THE PAST 12 MONTHS?: NO

## 2024-11-25 SDOH — SOCIAL STABILITY: SOCIAL INSECURITY: WITHIN THE LAST YEAR, HAVE YOU BEEN HUMILIATED OR EMOTIONALLY ABUSED IN OTHER WAYS BY YOUR PARTNER OR EX-PARTNER?: NO

## 2024-11-25 SDOH — HEALTH STABILITY: MENTAL HEALTH: NON-SPECIFIC ACTIVE SUICIDAL THOUGHTS (PAST 1 MONTH): NO

## 2024-11-25 SDOH — SOCIAL STABILITY: SOCIAL INSECURITY: WITHIN THE LAST YEAR, HAVE YOU BEEN AFRAID OF YOUR PARTNER OR EX-PARTNER?: NO

## 2024-11-25 SDOH — SOCIAL STABILITY: SOCIAL INSECURITY: HAVE YOU HAD THOUGHTS OF HARMING ANYONE ELSE?: NO

## 2024-11-25 SDOH — SOCIAL STABILITY: SOCIAL INSECURITY: HAVE YOU HAD ANY THOUGHTS OF HARMING ANYONE ELSE?: NO

## 2024-11-25 SDOH — ECONOMIC STABILITY: FOOD INSECURITY: WITHIN THE PAST 12 MONTHS, YOU WORRIED THAT YOUR FOOD WOULD RUN OUT BEFORE YOU GOT THE MONEY TO BUY MORE.: NEVER TRUE

## 2024-11-25 SDOH — ECONOMIC STABILITY: FOOD INSECURITY: HOW HARD IS IT FOR YOU TO PAY FOR THE VERY BASICS LIKE FOOD, HOUSING, MEDICAL CARE, AND HEATING?: NOT HARD AT ALL

## 2024-11-25 SDOH — SOCIAL STABILITY: SOCIAL INSECURITY: DOES ANYONE TRY TO KEEP YOU FROM HAVING/CONTACTING OTHER FRIENDS OR DOING THINGS OUTSIDE YOUR HOME?: NO

## 2024-11-25 SDOH — HEALTH STABILITY: MENTAL HEALTH: SUICIDAL BEHAVIOR (LIFETIME): NO

## 2024-11-25 SDOH — SOCIAL STABILITY: SOCIAL INSECURITY: VERBAL ABUSE: DENIES

## 2024-11-25 SDOH — HEALTH STABILITY: MENTAL HEALTH: WERE YOU ABLE TO COMPLETE ALL THE BEHAVIORAL HEALTH SCREENINGS?: YES

## 2024-11-25 ASSESSMENT — LIFESTYLE VARIABLES
HOW MANY STANDARD DRINKS CONTAINING ALCOHOL DO YOU HAVE ON A TYPICAL DAY: PATIENT DOES NOT DRINK
AUDIT-C TOTAL SCORE: 0
SKIP TO QUESTIONS 9-10: 1
HOW OFTEN DO YOU HAVE A DRINK CONTAINING ALCOHOL: NEVER
HOW OFTEN DO YOU HAVE 6 OR MORE DRINKS ON ONE OCCASION: NEVER
AUDIT-C TOTAL SCORE: 0

## 2024-11-25 ASSESSMENT — PAIN SCALES - GENERAL
PAINLEVEL_OUTOF10: 0 - NO PAIN

## 2024-11-25 ASSESSMENT — ACTIVITIES OF DAILY LIVING (ADL): LACK_OF_TRANSPORTATION: NO

## 2024-11-25 ASSESSMENT — PATIENT HEALTH QUESTIONNAIRE - PHQ9
1. LITTLE INTEREST OR PLEASURE IN DOING THINGS: NOT AT ALL
2. FEELING DOWN, DEPRESSED OR HOPELESS: NOT AT ALL
SUM OF ALL RESPONSES TO PHQ9 QUESTIONS 1 & 2: 0

## 2024-11-26 ENCOUNTER — ANESTHESIA EVENT (OUTPATIENT)
Dept: OBSTETRICS AND GYNECOLOGY | Facility: HOSPITAL | Age: 29
End: 2024-11-26
Payer: COMMERCIAL

## 2024-11-26 ENCOUNTER — ANESTHESIA (OUTPATIENT)
Dept: OBSTETRICS AND GYNECOLOGY | Facility: HOSPITAL | Age: 29
End: 2024-11-26
Payer: COMMERCIAL

## 2024-11-26 LAB — TREPONEMA PALLIDUM IGG+IGM AB [PRESENCE] IN SERUM OR PLASMA BY IMMUNOASSAY: NONREACTIVE

## 2024-11-26 PROCEDURE — 7210000002 HC LABOR PER HOUR

## 2024-11-26 PROCEDURE — 2500000001 HC RX 250 WO HCPCS SELF ADMINISTERED DRUGS (ALT 637 FOR MEDICARE OP): Performed by: ADVANCED PRACTICE MIDWIFE

## 2024-11-26 PROCEDURE — 3700000014 EPIDURAL BLOCK: Performed by: NURSE ANESTHETIST, CERTIFIED REGISTERED

## 2024-11-26 PROCEDURE — 10907ZC DRAINAGE OF AMNIOTIC FLUID, THERAPEUTIC FROM PRODUCTS OF CONCEPTION, VIA NATURAL OR ARTIFICIAL OPENING: ICD-10-PCS | Performed by: ADVANCED PRACTICE MIDWIFE

## 2024-11-26 PROCEDURE — 3E0P7VZ INTRODUCTION OF HORMONE INTO FEMALE REPRODUCTIVE, VIA NATURAL OR ARTIFICIAL OPENING: ICD-10-PCS | Performed by: ADVANCED PRACTICE MIDWIFE

## 2024-11-26 PROCEDURE — 59400 OBSTETRICAL CARE: CPT | Performed by: ADVANCED PRACTICE MIDWIFE

## 2024-11-26 PROCEDURE — 1100000001 HC PRIVATE ROOM DAILY

## 2024-11-26 PROCEDURE — 2500000004 HC RX 250 GENERAL PHARMACY W/ HCPCS (ALT 636 FOR OP/ED): Performed by: ADVANCED PRACTICE MIDWIFE

## 2024-11-26 PROCEDURE — 59050 FETAL MONITOR W/REPORT: CPT

## 2024-11-26 PROCEDURE — 7100000016 HC LABOR RECOVERY PER HOUR

## 2024-11-26 PROCEDURE — 59409 OBSTETRICAL CARE: CPT | Performed by: ADVANCED PRACTICE MIDWIFE

## 2024-11-26 PROCEDURE — 2500000004 HC RX 250 GENERAL PHARMACY W/ HCPCS (ALT 636 FOR OP/ED): Performed by: NURSE ANESTHETIST, CERTIFIED REGISTERED

## 2024-11-26 RX ORDER — NIFEDIPINE 10 MG/1
10 CAPSULE ORAL ONCE AS NEEDED
Status: DISCONTINUED | OUTPATIENT
Start: 2024-11-26 | End: 2024-11-27 | Stop reason: HOSPADM

## 2024-11-26 RX ORDER — DIPHENHYDRAMINE HCL 25 MG
25 CAPSULE ORAL EVERY 6 HOURS PRN
Status: DISCONTINUED | OUTPATIENT
Start: 2024-11-26 | End: 2024-11-27 | Stop reason: HOSPADM

## 2024-11-26 RX ORDER — OXYTOCIN 10 [USP'U]/ML
10 INJECTION, SOLUTION INTRAMUSCULAR; INTRAVENOUS ONCE AS NEEDED
Status: DISCONTINUED | OUTPATIENT
Start: 2024-11-26 | End: 2024-11-27 | Stop reason: HOSPADM

## 2024-11-26 RX ORDER — POLYETHYLENE GLYCOL 3350 17 G/17G
17 POWDER, FOR SOLUTION ORAL 2 TIMES DAILY PRN
Status: DISCONTINUED | OUTPATIENT
Start: 2024-11-26 | End: 2024-11-27 | Stop reason: HOSPADM

## 2024-11-26 RX ORDER — FENTANYL/ROPIVACAINE/NS/PF 2MCG/ML-.2
0-25 PLASTIC BAG, INJECTION (ML) INJECTION CONTINUOUS
Status: DISCONTINUED | OUTPATIENT
Start: 2024-11-26 | End: 2024-11-26

## 2024-11-26 RX ORDER — OXYTOCIN/0.9 % SODIUM CHLORIDE 30/500 ML
60 PLASTIC BAG, INJECTION (ML) INTRAVENOUS ONCE AS NEEDED
Status: DISCONTINUED | OUTPATIENT
Start: 2024-11-26 | End: 2024-11-27 | Stop reason: HOSPADM

## 2024-11-26 RX ORDER — SIMETHICONE 80 MG
80 TABLET,CHEWABLE ORAL 4 TIMES DAILY PRN
Status: DISCONTINUED | OUTPATIENT
Start: 2024-11-26 | End: 2024-11-27 | Stop reason: HOSPADM

## 2024-11-26 RX ORDER — LIDOCAINE 560 MG/1
1 PATCH PERCUTANEOUS; TOPICAL; TRANSDERMAL
Status: DISCONTINUED | OUTPATIENT
Start: 2024-11-26 | End: 2024-11-27 | Stop reason: HOSPADM

## 2024-11-26 RX ORDER — ADHESIVE BANDAGE
10 BANDAGE TOPICAL
Status: DISCONTINUED | OUTPATIENT
Start: 2024-11-26 | End: 2024-11-27 | Stop reason: HOSPADM

## 2024-11-26 RX ORDER — ACETAMINOPHEN 325 MG/1
975 TABLET ORAL EVERY 6 HOURS
Status: DISCONTINUED | OUTPATIENT
Start: 2024-11-26 | End: 2024-11-27 | Stop reason: HOSPADM

## 2024-11-26 RX ORDER — BISACODYL 10 MG/1
10 SUPPOSITORY RECTAL DAILY PRN
Status: DISCONTINUED | OUTPATIENT
Start: 2024-11-26 | End: 2024-11-27 | Stop reason: HOSPADM

## 2024-11-26 RX ORDER — ONDANSETRON HYDROCHLORIDE 2 MG/ML
4 INJECTION, SOLUTION INTRAVENOUS EVERY 6 HOURS PRN
Status: DISCONTINUED | OUTPATIENT
Start: 2024-11-26 | End: 2024-11-27 | Stop reason: HOSPADM

## 2024-11-26 RX ORDER — ONDANSETRON 4 MG/1
4 TABLET, FILM COATED ORAL EVERY 6 HOURS PRN
Status: DISCONTINUED | OUTPATIENT
Start: 2024-11-26 | End: 2024-11-27 | Stop reason: HOSPADM

## 2024-11-26 RX ORDER — HYDRALAZINE HYDROCHLORIDE 20 MG/ML
5 INJECTION INTRAMUSCULAR; INTRAVENOUS ONCE AS NEEDED
Status: DISCONTINUED | OUTPATIENT
Start: 2024-11-26 | End: 2024-11-27 | Stop reason: HOSPADM

## 2024-11-26 RX ORDER — NALBUPHINE HYDROCHLORIDE 10 MG/ML
10 INJECTION INTRAMUSCULAR; INTRAVENOUS; SUBCUTANEOUS ONCE
Status: COMPLETED | OUTPATIENT
Start: 2024-11-26 | End: 2024-11-26

## 2024-11-26 RX ORDER — METHYLERGONOVINE MALEATE 0.2 MG/ML
0.2 INJECTION INTRAVENOUS ONCE AS NEEDED
Status: DISCONTINUED | OUTPATIENT
Start: 2024-11-26 | End: 2024-11-27 | Stop reason: HOSPADM

## 2024-11-26 RX ORDER — LOPERAMIDE HYDROCHLORIDE 2 MG/1
4 CAPSULE ORAL EVERY 2 HOUR PRN
Status: DISCONTINUED | OUTPATIENT
Start: 2024-11-26 | End: 2024-11-27 | Stop reason: HOSPADM

## 2024-11-26 RX ORDER — TRANEXAMIC ACID 100 MG/ML
1000 INJECTION, SOLUTION INTRAVENOUS ONCE AS NEEDED
Status: DISCONTINUED | OUTPATIENT
Start: 2024-11-26 | End: 2024-11-27 | Stop reason: HOSPADM

## 2024-11-26 RX ORDER — CARBOPROST TROMETHAMINE 250 UG/ML
250 INJECTION, SOLUTION INTRAMUSCULAR ONCE AS NEEDED
Status: DISCONTINUED | OUTPATIENT
Start: 2024-11-26 | End: 2024-11-27 | Stop reason: HOSPADM

## 2024-11-26 RX ORDER — IBUPROFEN 600 MG/1
600 TABLET ORAL EVERY 6 HOURS
Status: DISCONTINUED | OUTPATIENT
Start: 2024-11-26 | End: 2024-11-27 | Stop reason: HOSPADM

## 2024-11-26 RX ORDER — DIPHENHYDRAMINE HYDROCHLORIDE 50 MG/ML
25 INJECTION INTRAMUSCULAR; INTRAVENOUS EVERY 6 HOURS PRN
Status: DISCONTINUED | OUTPATIENT
Start: 2024-11-26 | End: 2024-11-27 | Stop reason: HOSPADM

## 2024-11-26 RX ORDER — LABETALOL HYDROCHLORIDE 5 MG/ML
20 INJECTION, SOLUTION INTRAVENOUS ONCE AS NEEDED
Status: DISCONTINUED | OUTPATIENT
Start: 2024-11-26 | End: 2024-11-27 | Stop reason: HOSPADM

## 2024-11-26 RX ORDER — MISOPROSTOL 200 UG/1
800 TABLET ORAL ONCE AS NEEDED
Status: DISCONTINUED | OUTPATIENT
Start: 2024-11-26 | End: 2024-11-27 | Stop reason: HOSPADM

## 2024-11-26 SDOH — HEALTH STABILITY: MENTAL HEALTH: CURRENT SMOKER: 0

## 2024-11-26 ASSESSMENT — PAIN SCALES - GENERAL
PAINLEVEL_OUTOF10: 5 - MODERATE PAIN
PAINLEVEL_OUTOF10: 0 - NO PAIN
PAINLEVEL_OUTOF10: 5 - MODERATE PAIN
PAINLEVEL_OUTOF10: 0 - NO PAIN
PAINLEVEL_OUTOF10: 5 - MODERATE PAIN
PAINLEVEL_OUTOF10: 0 - NO PAIN
PAIN_LEVEL: 1
PAINLEVEL_OUTOF10: 0 - NO PAIN

## 2024-11-26 NOTE — SIGNIFICANT EVENT
[facility] LABOR PROGRESS NOTE  SUBJECTIVE:    Patient is coping well.  Reports she is now comfortable with epidural.    OBJECTIVE:   Visit Vitals  BP 93/53   Pulse 69   Temp 36.6 °C (97.9 °F) (Temporal)   Resp 16       FHR:      Baseline: 140  Variability: minimal   Accels: absent  Decels: none    Wireless Jenny monitor apparently no longer reading accurately, will convert to standard us EFM/toco                                      Contraction Frequency:  irregular, every 2-4 minutes, adequate cervical change    Cervical Exam:  7cm/100%/0    Membranes:  intact    Pitocin:  No     ASSESSMENT:  Xiomara Joy is a 29 y.o.  at 41w0d  Active Labor  Category II FHR, overall reassuring with now moderate variability  GBS unknown, ( neg 10/15)  Membranes intact  Difficulty with wireless EFM    PLAN:  Labor mgmt: Continue expectant mgmt at this time.  Discussed r/b/a pitocin per protocol if ctxs space, pt understands and agrees, will defer at this time.  Clear liquids  Frequent position changes  Continuous fetal monitoring with wireless monitor  Reassess as indicated  Anticipate SVB    Electronically signed by DIONICIO Marquez on 2024 at 4:40 AM

## 2024-11-26 NOTE — ANESTHESIA POSTPROCEDURE EVALUATION
Patient: Xiomara Joy    Procedure Summary       Date: 11/26/24 Room / Location:     Anesthesia Start: 0331 Anesthesia Stop: 0514    Procedure: Labor Analgesia Diagnosis:     Scheduled Providers:  Responsible Provider: HILLARY Espinal    Anesthesia Type: epidural ASA Status: 2            Anesthesia Type: epidural    Vitals Value Taken Time   BP 93/50 11/26/24 0558   Temp 98.2 11/26/24 0612   Pulse 60 11/26/24 0610   Resp 18 11/26/24 0612   SpO2 99 % 11/26/24 0610       Anesthesia Post Evaluation    Patient location during evaluation: bedside  Patient participation: complete - patient participated  Level of consciousness: awake and alert  Pain score: 1  Pain management: adequate  Multimodal analgesia pain management approach  Airway patency: patent  Cardiovascular status: acceptable  Respiratory status: acceptable  Hydration status: acceptable  Postoperative Nausea and Vomiting: none      No notable events documented.

## 2024-11-26 NOTE — L&D DELIVERY NOTE
OB Delivery Note  2024  Xiomara Joy  29 y.o.   Vaginal, Spontaneous     Delivery Details:  Xiomara Joy, a 29 y.o.  female was found to be complete and had deep variable decels with ctxs.  Attempts at pushing were productive.  Dr. Soria notified in case of need for vacuum assisted delivery.  Moderate variability with return to baseline when pt repositioned between ctxs.  With great progress of descent with pushing efforts, she delivered a viable Female infant with Apgars of 7  and 9 . The patient was put in the dorsal lithotomy position.  Hands-on perineum at  with controlled extension of fetal head. A tight nuchal cord was noted and reduced.  Shoulders delivered swiftly following restitution. Delivery was via Vaginal, Spontaneous to a sterile field under Epidural anesthesia. Infant delivered in Vertex Right Occiput Anterior position. Postpartum pitocin administered IM secondary to national IV fluid shortage. Vigorous infant placed on maternal abdomen for skin to skin. Cord cut by FOB after delayed cord clamping. Placenta delivered spontaneously and in tact with gentle traction on umbilical cord.  Trailing membranes were noted which were gently teased out with ring forceps and gently manually removed from lower uterine segment.  Cord blood was obtained in routine fashion. Cord complications were:  nuchal x 1, no other complications noted.  Fundal massage performed. Fundus palpated firm, midline, and below umbilicus. Perineum, vagina, cervix were inspected, and found to be intact.  Excellent hemostasis was noted and fundus remains firm. Needle count correct. Infant and patient in delivery room in good and stable condition.       Gestational Age: 41w0d  /Para:   Quantitative Blood Loss: Admission to Discharge: 0 mL (2024  7:48 PM - 2024  6:19 AM)    Oly Joy [[id number]]      Labor Events    Rupture date/time: 2024 0449  Rupture type:  Artificial  Fluid color: Clear  Labor type: Induced Onset of Labor  Labor allowed to proceed with plans for an attempted vaginal birth?: Yes  Induction: Misoprostol  First cervical ripening date/time: 2024  Induction indications: Post-term Gestation  Augmentation: AROM  Augmentation date/time: 2024  Complications: Fetal Intolerance       Labor Event Times    Dilation complete date/time: 2024       Placenta    Placenta delivery date/time:   Placenta removal: Spontaneous  Placenta appearance: Intact       Cord    Vessels: 3 vessels  Complications: Nuchal  Nuchal intervention: reduced  Nuchal cord description: tight nuchal cord  Number of loops: 1  Delayed cord clamping?: Yes  Cord blood disposition: Discarded  Gases sent?: No       Lacerations    Episiotomy: None  Perineal laceration: None  Other lacerations?: No  Repair suture: None       Anesthesia    Method: Epidural       Operative Delivery    Forceps attempted?: No  Vacuum extractor attempted?: No       Shoulder Dystocia    Shoulder dystocia present?: No        Delivery    Birth date/time: 2024 05:14:00  Delivery type: Vaginal, Spontaneous  Complications: Fetal Intolerance       Resuscitation    Method: Suctioning, Tactile stimulation       Apgars    Living status: Living  Apgar Component Scores:  1 min.:  5 min.:  10 min.:  15 min.:  20 min.:    Skin color:  1  1       Heart rate:  2  2       Reflex irritability:  2  2       Muscle tone:  1  2       Respiratory effort:  1  2       Total:  7  9       Apgars assigned by: JENISE PERSON       Delivery Providers    Delivering clinician:    Provider Role     Delivery Nurse    Faustino Vyas, RN Nursery Nurse     Resident                 Intrauterine Device Inserted : No, pt declines.  Considering at 6wk PP    DIONICIO Marquez

## 2024-11-26 NOTE — ANESTHESIA PROCEDURE NOTES
Epidural Block    Patient location during procedure: OB  Start time: 11/26/2024 3:31 AM  End time: 11/26/2024 3:40 AM  Reason for block: at surgeon's request and labor analgesia  Staffing  Performed: BRAEDEN   Authorized by: HILLARY Espinal    Performed by: HILLARY Espinal    Preanesthetic Checklist  Completed: patient identified, IV checked, risks and benefits discussed, surgical consent, pre-op evaluation, timeout performed and sterile techniques followed  Block Timeout  RN/Licensed healthcare professional reads aloud to the Anesthesia provider and entire team: Patient identity, procedure with side and site, patient position, and as applicable the availability of implants/special equipment/special requirements.    Timeout performed at: 11/26/2024 3:32 AM  Block Placement  Patient position: sitting  Prep: ChloraPrep  Sterility prep: cap, drape, gloves, mask and hand  Sedation level: no sedation  Patient monitoring: blood pressure, continuous pulse oximetry and heart rate  Approach: midline  Local numbing: lidocaine 1% to skin and subcutaneous tissues  Vertebral space: lumbar  Lumbar location: L4-L5  Epidural  Loss of resistance technique: saline  Guidance: landmark technique        Needle  Needle type: Halima   Needle gauge: 17  Needle length: 10 cm  Needle insertion depth: 7 cm  Catheter type: multi-orifice  Catheter size: 19 G  Catheter at skin depth: 12 cm  Catheter securement method: clear occlusive dressing and surgical tape    Test dose: lidocaine 1.5% with epinephrine 1-to-200,000  Test dose: lidocaine 1.5% with epinephrine 1-to-200,000  Test dose result: no positive test dose            Assessment  Sensory level: T10  Block outcome: pain improved  Number of attempts: 1  Events: no positive test dose  Procedure assessment: patient tolerated procedure well with no immediate complications

## 2024-11-26 NOTE — ANESTHESIA PREPROCEDURE EVALUATION
Patient: Xiomara Joy    Evaluation Method: In-person visit    Procedure Information    Date: 11/26/24  Procedure: Labor Analgesia         Relevant Problems   ID   (+) HSV infection       Clinical information reviewed:   Tobacco  Allergies  Meds  Problems     Fam Hx          NPO Detail:  NPO/Void Status  Date of Last Liquid: 11/25/24  Time of Last Liquid: 2000  Date of Last Solid: 11/25/24  Time of Last Solid: 1500         OB/Gyn Evaluation    Present Pregnancy    Patient is pregnant now.   Obstetric History            Physical Exam    Airway  Mallampati: II  TM distance: >3 FB  Neck ROM: full     Cardiovascular - normal exam     Dental - normal exam     Pulmonary - normal exam     Abdominal - normal exam         Anesthesia Plan    History of general anesthesia?: no  History of complications of general anesthesia?: no    ASA 2     epidural     The patient is not a current smoker.    Anesthetic plan and risks discussed with patient.  Use of blood products discussed with patient who consented to blood products.    Plan discussed with CRNA and attending.

## 2024-11-26 NOTE — H&P
OB Admission H&P    Assessment/Plan    Xiomara Joy is a 29 y.o.  at 40w6d, SOLOMON: 2024, by Last Menstrual Period, who presents for Induction of Labor for late term pregnancy.    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  -Dr. Soria notified of pt admission, status, and plan of care as noted.    Fetal Status  -NST reactive, reassuring   -Presentation cephalic based on ultrasound and vaginal exam  -EFW 7.5 by ultrasound, 7# by clinical assessment  -GBS , but was negative 10/15.  Will treat by risk criteria, swab repeated now in case of peds concerns, pt aware.  Will avoid early AROM.    Postpartum  Contraception Plan: patient declined    Pregnancy Problems (from 04/15/24 to present)       Problem Noted Diagnosed Resolved    Post-dates pregnancy ([facility]) 2024 by DARY Marquez-BRIDGERM  No    Priority:  Medium       Post-term pregnancy, 40-42 weeks of gestation ([facility]) 2024 by Haily Fernandes MD  No    Priority:  Medium       Overview Addendum 2024 11:31 AM by Haily Fernandes MD     Plan induction at 41W - declines membrane stripping or earlier induction  Ind  8pm                 Subjective   Good fetal movement.  Denies vaginal bleeding., Denies contractions., Denies leaking of fluid.      Prenatal Provider Dom    OB History    Para Term  AB Living   2 1 1 0 0 1   SAB IAB Ectopic Multiple Live Births   0 0 0 0 1      # Outcome Date GA Lbr Higinio/2nd Weight Sex Type Anes PTL Lv   2 Current            1 Term 19   2.722 kg F Vag-Spont   REYNOLD      Name: Arelis       Past Surgical History:   Procedure Laterality Date    BREAST BIOPSY Left 2021     WISDOM TOOTH EXTRACTION         Social History     Tobacco Use    Smoking status: Never     Passive exposure: Never    Smokeless tobacco: Never   Substance Use Topics    Alcohol use: Not Currently     Comment: socially       No Known Allergies    Medications Prior to Admission   Medication Sig Dispense Refill Last Dose/Taking    terconazole (Terazol 3) 0.8 % vaginal cream    Unknown    tretinoin (Retin-A) 0.025 % cream Apply a pea-size amount to entire face at bedtime. Decrease use if too drying. 45 g 8 Unknown    valacyclovir HCl (VALACYCLOVIR ORAL)    Unknown     Objective     Last Vitals  Temp Pulse Resp BP MAP O2 Sat   36.7 °C (98.1 °F) 71 16 134/81 100 98 %     Blood Pressures         11/25/2024 2048             BP: 134/81             Physical Exam  General: NAD, mood appropriate  Cardiopulmonary: warm and well perfused, breathing comfortably on room air  Abdomen: Gravid, non-tender  Extremities: Symmetric  Speculum Exam: deferred  Cervix: 1.5  /50  /-3  , soft, mid-position  Pelvis subjectively adequate, proven to 6# with uncomplicated vaginal birth.     Fetal Monitoring  Baseline: 135 bpm, Variability: moderate,  Accelerations: present and Decelerations: none  Uterine Activity: Irregular contractions, rare.  Pt does not endorse feeling any.  Interpretation: Reactive    Bedside ultrasound: Yes, cephalic confirmed    Labs in chart were reviewed.   Results from last 7 days   Lab Units 11/25/24 2056   WBC AUTO x10*3/uL 8.1   HEMOGLOBIN g/dL 12.0   HEMATOCRIT % 35.9*   PLATELETS AUTO x10*3/uL 172        Prenatal labs reviewed, not remarkable.  GBS negative 10/15, now >5 weeks.

## 2024-11-26 NOTE — LACTATION NOTE
Lactation Consultant Note  Lactation Consultation  Reason for Consult: Initial assessment  Consultant Name: Netta PINO    Maternal Information  Has mother  before?: Yes  How long did the mother previously breastfeed?: 9 months  Previous Maternal Breastfeeding Challenges: None  Infant to breast within first 2 hours of birth?: Yes  Exclusive Pump and Bottle Feed: No    Maternal Assessment  Breast Assessment: Medium, Soft, Compressible  Nipple Assessment: Intact, Erect  [place] Assessment: Normal    Infant Assessment  Infant Behavior: Awake  Infant Assessment:  (feeding)    Feeding Assessment  Nutrition Source: Breastmilk  Feeding Method: Nursing at the breast  Feeding Position: Laid back, Baby led  Suck/Feeding: Baby led rhythmically  Latch Assessment: Sucks with long jaw movement, Chin moves in rhythmic motion, Comfortable with no pain    LATCH TOOL  Latch: Grasps breast, tongue down, lips flanged, rhythmic sucking  Audible Swallowing: A few with stimulation  Type of Nipple: Everted (After stimulation)  Comfort (Breast/Nipple): Soft/non-tender  Hold (Positioning): Minimal assist, teach one side, mother does other, staff holds  LATCH Score: 8    Breast Pump       Other OB Lactation Tools       Patient Follow-up  Inpatient Lactation Follow-up Needed :  (upon request)    Other OB Lactation Documentation       Recommendations/Summary  Baby was latched to the right side in a laid back position at the time of my visit. Mom reports that baby has bedn latching without difficulty. Mom also reports the the latch is comfortable. Reviewed milk supply patterns and  feeding patterns in the fist and second 24 HOL.Mom was asked to attempt/feed baby at least every 2-3 hours or on demand with a goal of 8-12 feeds daily. Feeding cues reviewed.Breastfeeding education reviewed and questions answered. Mom aware of lactation support and asked to call out for feed assistance and with questions as needed.

## 2024-11-26 NOTE — SIGNIFICANT EVENT
[facility] LABOR PROGRESS NOTE  SUBJECTIVE:    Patient is coping well.  Reports some increased cramping/pain with ctxs, but they are spaced more now.  Interested in nubain for comfort/rest.  Epidural:  not yet    OBJECTIVE:   Visit Vitals  /81   Pulse 71   Temp 36.7 °C (98.1 °F) (Temporal)   Resp 16       FHR:      Baseline: 140  Variability: moderate  Accels: present  Decels: none                                      Contraction Frequency:  irregular, every 2-6 minutes    Cervical Exam:  2cm/60%/-2    Membranes:  intact    Pitocin:  No     ASSESSMENT:  Xiomara Joy is a 29 y.o.  at 41w0d  Unfavorable cervix s/p cyto #1  Category I FHR  GBS neg (), pending repeat  Membranes intact    PLAN:  Labor mgmt: Cytotec #2 now for cervical ripening  Activity as tolerated  Regular diet  Frequent position changes  Continuous fetal monitoring  Risk-based mgmt of GBS unknown ( negative from 10/15).  No tx at this time.  Reassess as indicated  Anticipate SVB  Dr. Soria updated on pt status and plan of care    Electronically signed by DIONICOI Marquez on 2024 at 12:20 AM

## 2024-11-27 VITALS
OXYGEN SATURATION: 98 % | RESPIRATION RATE: 18 BRPM | BODY MASS INDEX: 27.96 KG/M2 | HEART RATE: 62 BPM | SYSTOLIC BLOOD PRESSURE: 114 MMHG | HEIGHT: 64 IN | WEIGHT: 163.8 LBS | DIASTOLIC BLOOD PRESSURE: 62 MMHG | TEMPERATURE: 98.6 F

## 2024-11-27 PROBLEM — Z87.19 HISTORY OF CHOLESTASIS DURING PREGNANCY: Status: RESOLVED | Noted: 2024-04-15 | Resolved: 2024-11-27

## 2024-11-27 PROBLEM — Z87.59 HISTORY OF CHOLESTASIS DURING PREGNANCY: Status: RESOLVED | Noted: 2024-04-15 | Resolved: 2024-11-27

## 2024-11-27 PROBLEM — O48.0 POST-TERM PREGNANCY, 40-42 WEEKS OF GESTATION (HHS-HCC): Status: RESOLVED | Noted: 2024-11-22 | Resolved: 2024-11-27

## 2024-11-27 PROBLEM — O48.0 POST-DATES PREGNANCY (HHS-HCC): Status: RESOLVED | Noted: 2024-11-25 | Resolved: 2024-11-27

## 2024-11-27 PROCEDURE — 2500000001 HC RX 250 WO HCPCS SELF ADMINISTERED DRUGS (ALT 637 FOR MEDICARE OP): Performed by: ADVANCED PRACTICE MIDWIFE

## 2024-11-27 ASSESSMENT — PAIN SCALES - GENERAL
PAINLEVEL_OUTOF10: 0 - NO PAIN
PAINLEVEL_OUTOF10: 0 - NO PAIN
PAINLEVEL_OUTOF10: 4

## 2024-11-27 NOTE — CARE PLAN
The patient's goals for the shift include less pain/ more sleep    The clinical goals for the shift include patient will be safe, HDS, and have matenal bonding with child    Problem: Postpartum  Goal: Experiences normal postpartum course  2024 by Razia Clark RN  Outcome: Progressing  2024 by Razia Clark RN  Outcome: Progressing  Goal: Appropriate maternal -  bonding  2024 by Razia Clark RN  Outcome: Progressing  2024 by Razia Clark RN  Outcome: Progressing  Goal: Establish and maintain infant feeding pattern for adequate nutrition  2024 by Razia Clark RN  Outcome: Progressing  2024 by Razia Clark RN  Outcome: Progressing  Goal: Incisions, wounds, or drain sites healing without S/S of infection  2024 by Razia Clark RN  Outcome: Progressing  2024 by Razia Clark RN  Outcome: Progressing  Goal: No s/sx infection  2024 by Razia Clark RN  Outcome: Progressing  2024 by Razia Clark RN  Outcome: Progressing  Goal: No s/sx of hemorrhage  2024 by Razia Clark RN  Outcome: Progressing  2024 by Razia Clark RN  Outcome: Progressing  Goal: Minimal s/sx of HDP and BP<160/110  2024 by Razia Clark RN  Outcome: Progressing  2024 by Razia Clark RN  Outcome: Progressing     Problem: Pain - Adult  Goal: Verbalizes/displays adequate comfort level or baseline comfort level  Outcome: Progressing     Problem: Safety - Adult  Goal: Free from fall injury  Outcome: Progressing     Problem: Discharge Planning  Goal: Discharge to home or other facility with appropriate resources  Outcome: Progressing

## 2024-11-27 NOTE — DISCHARGE SUMMARY
Discharge Summary    Admission Date: 2024  Discharge Date: 2024      Discharge Diagnosis  Post-dates pregnancy ([facility])        Hospital Course  Delivery Date: 2024 5:14 AM  Delivery type: Vaginal, Spontaneous   GA at delivery: 41w0d  Outcome: Living  Anesthesia during delivery: Epidural  Intrapartum complications: Fetal Intolerance  Feeding method: Breastfeeding Status: Yes     Procedures: none  Contraception at discharge: none  Will discuss at PP visit    Pertinent Physical Exam At Time of Discharge    General: Examination reveals a well developed, well nourished, female, in no acute distress. She is alert and cooperative.  Breasts: breasts appear normal for pregnancy, no suspicious masses, no skin or nipple changes or axillary nodes.  Fundus: firm, below umbilicus, and nontender.  Perineum: well approximated.  Psychological: awake and alert; oriented to person, place, and time.    Last Vitals:  Temp Pulse Resp BP MAP Pulse Ox   37 °C (98.6 °F) 62 18 114/62 80 98 %     Discharge Meds     Your medication list        STOP taking these medications      terconazole 0.8 % vaginal cream  Commonly known as: Terazol 3        tretinoin 0.025 % cream  Commonly known as: Retin-A        VALACYCLOVIR ORAL                  Complications Requiring Follow-Up  none    Test Results Pending At Discharge  Pending Labs       Order Current Status    Group B Streptococcus (GBS) Prenatal Screen, Culture In process            Outpatient Follow-Up  Future Appointments   Date Time Provider Department Center   2025  8:30 AM SHEY Rasheed [id number]       I spent > 30  minutes in the professional and overall care of this patient.      DIONICIO Suggs

## 2024-11-27 NOTE — DISCHARGE INSTRUCTIONS
Discharge instructions:     Call 911 or go to nearest emergency room right away if you have: PAIN or pressure in chest, OBSTRUCTED breathing or shortness of breath, SEIZURES, THOUGHTS of hurting yourself or your baby, heart palpitations/racing, change in alertness/confusion.    Pain Management:  -  Use acetaminophen (Tylenol)  as recommended on the bottle  -  Use ibuprofen (Motrin, Advil) as recommended on the bottle     Perineal Care:   -  If you have stiches, they should dissolve on their own within 6 weeks  -  Use brie/squirt bottle to rinse your perineal area with warm water after bowel movements and urination until vaginal bleeding ceases.  -  You may use Sitz baths for 10-15 minutes, 3-4x a day  -  Consider ice packs, as well as medicated pads and sprays for discomfort   -  It is important to stay hydrated, and to take stool softeners (docusate sodium/Colace) if needed, to keep your bowels soft while your bottom is healing. Milk of Magnesia and MiraLax (polyethylene glycol) may also be helpful.     Diet/supplementation:  -  Resume your pre-hospital diet  -  Drink plenty of water, especially if you are breastfeeding  -  Take your prenatal vitamin for as long as you are breastfeeding. Consider 5-6,000IU of vitamin D3 if you are breastfeeding to increase vitamin D levels in your breastmilk. This also may decrease incidence of “baby blues” and mood disturbance    Activity after discharge:  -  6 weeks pelvic rest. Please don’t have sex or put anything in your vaginal, including tampons or douching  -  Shower daily. If you have an incision, blow-dry on low, cool setting, or towel-dry the area  -  No lifting greater than 15lbs for 6 weeks  -  Walking and stairs as tolerated  -  Gradually increase your activity level until back to normal at approximately 6 weeks postpartum    Follow up appointment:  -  6 weeks  -  May have evaluation at 1-3 weeks for closer monitoring    Please call if:  -  You have feelings that you  want to harm yourself or others  -  You are having large-sized blood clots or soaking more than one large pad in an hour  -  You have vaginal discharge with a foul odor  -  You are having pain or burning with urination  -  Your temperature is greater than 100.4 F  -  Your pain is not controlled by the pain medication you are taking  -  You have trouble breathing at rest or when lying flat in bed  -  You have pain or tenderness in your calves  -  You are feeling weak, faint or dizzy  -  You experience worsening swelling to the feet or legs  -  You experience severe headaches and/or vision changes and/or pain in your upper abdomen   -  You experience severe chest pain  -  You have a painful, red area on your breast    Important phone numbers:  -  Answering service available 24 hours a day: [phone]  -  Midwife office: [phone]

## 2024-11-28 ENCOUNTER — HOSPITAL ENCOUNTER (OUTPATIENT)
Facility: HOSPITAL | Age: 29
Discharge: HOME | End: 2024-11-28
Attending: OBSTETRICS & GYNECOLOGY | Admitting: OBSTETRICS & GYNECOLOGY
Payer: COMMERCIAL

## 2024-11-28 ENCOUNTER — HOSPITAL ENCOUNTER (EMERGENCY)
Facility: HOSPITAL | Age: 29
Discharge: STILL ADMITTED OR ADMINISTRATIVE | End: 2024-11-29
Payer: COMMERCIAL

## 2024-11-28 VITALS
BODY MASS INDEX: 26.05 KG/M2 | SYSTOLIC BLOOD PRESSURE: 127 MMHG | HEART RATE: 63 BPM | WEIGHT: 152.56 LBS | TEMPERATURE: 97.2 F | DIASTOLIC BLOOD PRESSURE: 85 MMHG | HEIGHT: 64 IN | RESPIRATION RATE: 16 BRPM | OXYGEN SATURATION: 99 %

## 2024-11-28 VITALS
RESPIRATION RATE: 18 BRPM | HEIGHT: 64 IN | OXYGEN SATURATION: 98 % | BODY MASS INDEX: 25.61 KG/M2 | WEIGHT: 150 LBS | TEMPERATURE: 97.3 F | HEART RATE: 67 BPM | DIASTOLIC BLOOD PRESSURE: 85 MMHG | SYSTOLIC BLOOD PRESSURE: 160 MMHG

## 2024-11-28 DIAGNOSIS — R52 POSTPARTUM PAIN (HHS-HCC): ICD-10-CM

## 2024-11-28 LAB — GP B STREP GENITAL QL CULT: NORMAL

## 2024-11-28 PROCEDURE — 4500999001 HC ED NO CHARGE

## 2024-11-28 PROCEDURE — 99213 OFFICE O/P EST LOW 20 MIN: CPT

## 2024-11-28 RX ORDER — ACETAMINOPHEN 500 MG
500 TABLET ORAL EVERY 6 HOURS PRN
COMMUNITY

## 2024-11-28 RX ORDER — CYCLOBENZAPRINE HCL 5 MG
5 TABLET ORAL 3 TIMES DAILY PRN
Qty: 15 TABLET | Refills: 0 | Status: SHIPPED | OUTPATIENT
Start: 2024-11-28

## 2024-11-28 RX ORDER — MULTIVIT-MIN/IRON FUM/FOLIC AC 7.5 MG-4
1 TABLET ORAL DAILY
COMMUNITY

## 2024-11-28 RX ORDER — IBUPROFEN 200 MG
600 TABLET ORAL EVERY 6 HOURS PRN
Qty: 100 TABLET | Refills: 0 | Status: SHIPPED | OUTPATIENT
Start: 2024-11-28 | End: 2024-11-28 | Stop reason: HOSPADM

## 2024-11-28 RX ORDER — IBUPROFEN 600 MG/1
600 TABLET ORAL EVERY 6 HOURS PRN
Qty: 50 TABLET | Refills: 0 | Status: SHIPPED | OUTPATIENT
Start: 2024-11-28

## 2024-11-28 RX ORDER — CAFFEINE 200 MG
100 TABLET ORAL EVERY 8 HOURS PRN
Qty: 10 TABLET | Refills: 0 | Status: SHIPPED | OUTPATIENT
Start: 2024-11-28

## 2024-11-28 SDOH — HEALTH STABILITY: MENTAL HEALTH: SUICIDAL BEHAVIOR (LIFETIME): NO

## 2024-11-28 SDOH — SOCIAL STABILITY: SOCIAL INSECURITY: HAVE YOU HAD THOUGHTS OF HARMING ANYONE ELSE?: NO

## 2024-11-28 SDOH — HEALTH STABILITY: MENTAL HEALTH: NON-SPECIFIC ACTIVE SUICIDAL THOUGHTS (PAST 1 MONTH): NO

## 2024-11-28 SDOH — SOCIAL STABILITY: SOCIAL INSECURITY: DOES ANYONE TRY TO KEEP YOU FROM HAVING/CONTACTING OTHER FRIENDS OR DOING THINGS OUTSIDE YOUR HOME?: NO

## 2024-11-28 SDOH — SOCIAL STABILITY: SOCIAL INSECURITY: ARE YOU OR HAVE YOU BEEN THREATENED OR ABUSED PHYSICALLY, EMOTIONALLY, OR SEXUALLY BY ANYONE?: NO

## 2024-11-28 SDOH — ECONOMIC STABILITY: HOUSING INSECURITY: DO YOU FEEL UNSAFE GOING BACK TO THE PLACE WHERE YOU ARE LIVING?: NO

## 2024-11-28 SDOH — HEALTH STABILITY: MENTAL HEALTH: WERE YOU ABLE TO COMPLETE ALL THE BEHAVIORAL HEALTH SCREENINGS?: YES

## 2024-11-28 SDOH — HEALTH STABILITY: MENTAL HEALTH: WISH TO BE DEAD (PAST 1 MONTH): NO

## 2024-11-28 SDOH — HEALTH STABILITY: MENTAL HEALTH: HAVE YOU USED ANY SUBSTANCES (CANABIS, COCAINE, HEROIN, HALLUCINOGENS, INHALANTS, ETC.) IN THE PAST 12 MONTHS?: NO

## 2024-11-28 SDOH — SOCIAL STABILITY: SOCIAL INSECURITY: PHYSICAL ABUSE: DENIES

## 2024-11-28 SDOH — SOCIAL STABILITY: SOCIAL INSECURITY: HAVE YOU HAD ANY THOUGHTS OF HARMING ANYONE ELSE?: NO

## 2024-11-28 SDOH — SOCIAL STABILITY: SOCIAL INSECURITY: ABUSE SCREEN: ADULT

## 2024-11-28 SDOH — SOCIAL STABILITY: SOCIAL INSECURITY: DO YOU FEEL ANYONE HAS EXPLOITED OR TAKEN ADVANTAGE OF YOU FINANCIALLY OR OF YOUR PERSONAL PROPERTY?: NO

## 2024-11-28 SDOH — HEALTH STABILITY: MENTAL HEALTH: HAVE YOU USED ANY PRESCRIPTION DRUGS OTHER THAN PRESCRIBED IN THE PAST 12 MONTHS?: NO

## 2024-11-28 SDOH — SOCIAL STABILITY: SOCIAL INSECURITY: HAS ANYONE EVER THREATENED TO HURT YOUR FAMILY OR YOUR PETS?: NO

## 2024-11-28 SDOH — SOCIAL STABILITY: SOCIAL INSECURITY: ARE THERE ANY APPARENT SIGNS OF INJURIES/BEHAVIORS THAT COULD BE RELATED TO ABUSE/NEGLECT?: NO

## 2024-11-28 SDOH — SOCIAL STABILITY: SOCIAL INSECURITY: VERBAL ABUSE: DENIES

## 2024-11-28 ASSESSMENT — PAIN DESCRIPTION - PAIN TYPE: TYPE: ACUTE PAIN

## 2024-11-28 ASSESSMENT — LIFESTYLE VARIABLES
AUDIT-C TOTAL SCORE: 0
AUDIT-C TOTAL SCORE: 0
HOW OFTEN DO YOU HAVE 6 OR MORE DRINKS ON ONE OCCASION: NEVER
HOW OFTEN DO YOU HAVE A DRINK CONTAINING ALCOHOL: NEVER
SKIP TO QUESTIONS 9-10: 1
HOW MANY STANDARD DRINKS CONTAINING ALCOHOL DO YOU HAVE ON A TYPICAL DAY: PATIENT DOES NOT DRINK

## 2024-11-28 ASSESSMENT — PAIN DESCRIPTION - DESCRIPTORS: DESCRIPTORS: ACHING

## 2024-11-28 ASSESSMENT — PAIN DESCRIPTION - ONSET: ONSET: ONGOING

## 2024-11-28 ASSESSMENT — COLUMBIA-SUICIDE SEVERITY RATING SCALE - C-SSRS
2. HAVE YOU ACTUALLY HAD ANY THOUGHTS OF KILLING YOURSELF?: NO
6. HAVE YOU EVER DONE ANYTHING, STARTED TO DO ANYTHING, OR PREPARED TO DO ANYTHING TO END YOUR LIFE?: NO
1. IN THE PAST MONTH, HAVE YOU WISHED YOU WERE DEAD OR WISHED YOU COULD GO TO SLEEP AND NOT WAKE UP?: NO

## 2024-11-28 ASSESSMENT — PATIENT HEALTH QUESTIONNAIRE - PHQ9
2. FEELING DOWN, DEPRESSED OR HOPELESS: NOT AT ALL
1. LITTLE INTEREST OR PLEASURE IN DOING THINGS: NOT AT ALL
SUM OF ALL RESPONSES TO PHQ9 QUESTIONS 1 & 2: 0

## 2024-11-28 ASSESSMENT — PAIN DESCRIPTION - PROGRESSION: CLINICAL_PROGRESSION: NOT CHANGED

## 2024-11-28 ASSESSMENT — PAIN DESCRIPTION - FREQUENCY: FREQUENCY: CONSTANT/CONTINUOUS

## 2024-11-28 ASSESSMENT — PAIN - FUNCTIONAL ASSESSMENT: PAIN_FUNCTIONAL_ASSESSMENT: 0-10

## 2024-11-28 ASSESSMENT — ACTIVITIES OF DAILY LIVING (ADL): LACK_OF_TRANSPORTATION: NO

## 2024-11-28 ASSESSMENT — PAIN SCALES - GENERAL
PAINLEVEL_OUTOF10: 8

## 2024-11-28 ASSESSMENT — PAIN DESCRIPTION - LOCATION: LOCATION: HEAD

## 2024-11-28 ASSESSMENT — PAIN DESCRIPTION - ORIENTATION: ORIENTATION: RIGHT

## 2024-11-29 NOTE — H&P
OB Triage H&P    Assessment/Plan    Xiomara Joy is a 29 y.o.  at 41w0d, SOLOMON: 2024, by Last Menstrual Period, who presents to triage with spinal headache    Spinal headache  -presentation and recent epidural consistent with spinal headache  -anesthesia consulted and recommended blood patch vs supportive measures. Patient declined blood patch  -discussed with patient that resolution may take up to 2 weeks to resolve and pain medications may not be helpful. Patient expressed understanding and desired supportive measures  -continue tylenol and ibuprofen at home - can alternate q3hrs  -flexeril Rxed  -discussed with patient that she can return if she changes her mind regarding blood patch    Postpartum  -meeting all milestones    Dispo  -Patient appropriate for discharge home, agrees with plan  -Return precautions discussed   -Follow up at next scheduled OB appointment or to triage sooner as needed    Discussed plan and reviewed with: Dr. Homero Jackson MD PGY-2    Pregnancy Problems (from 04/15/24 to present)       Problem Noted Diagnosed Resolved    Post-dates pregnancy ([facility]) 2024 by DIONICIO Marquez  2024 by DIONICIO Suggs    Post-term pregnancy, 40-42 weeks of gestation ([facility]) 2024 by Haily Fernandes MD  2024 by DIONICIO Suggs    Overview Addendum 2024 11:31 AM by Haily Fernandes MD     Plan induction at 41W - declines membrane stripping or earlier induction  Ind  8pm                 Subjective   Patient had uncomplicated delivery on . Had an epidural. Pregnancy uncomplicated. Patient reports that since discharge from hospital yesterday has had 8/10 headache and neck stiffness. States its worse when she is upright. Completely resolves when she lays down.     No photophobia/phonophobia, fevers/chills, nausea/vomiting. Patient does not report visual changes, chest pain, shortness of breath or  RUQ pain      Lochia light, doing well with breastfeeding. Otherwise, pain well controlled    Prenatal Provider Dr. Fernandes    OB History    Para Term  AB Living   2 2 2 0 0 2   SAB IAB Ectopic Multiple Live Births   0 0 0 0 2      # Outcome Date GA Lbr Higinio/2nd Weight Sex Type Anes PTL Lv   2 Term 24 41w0d 09:01 / 00:25 3.17 kg F Vag-Spont EPI  REYNOLD      Complications: Fetal Intolerance      Name: Oly Joy      Apgar1: 7  Apgar5: 9   1 Term 19   2.722 kg F Vag-Spont   REYNOLD      Name: Arelis       Past Surgical History:   Procedure Laterality Date    BREAST BIOPSY Left 2021    WISDOM TOOTH EXTRACTION         Social History     Tobacco Use    Smoking status: Never     Passive exposure: Never    Smokeless tobacco: Never   Substance Use Topics    Alcohol use: Not Currently     Comment: socially       No Known Allergies    Medications Prior to Admission   Medication Sig Dispense Refill Last Dose/Taking    multivitamin with minerals tablet Take 1 tablet by mouth once daily.   2024 at  4:00 PM    acetaminophen (Tylenol) 500 mg tablet Take 1 tablet (500 mg) by mouth every 6 hours if needed for mild pain (1 - 3).        Objective     Last Vitals  Temp Pulse Resp BP MAP O2 Sat   (!) 35.8 °C (96.4 °F) 62 16 129/80 100 99 %     Blood Pressures         2024             BP: 129/80             Physical Exam  General: NAD, mood appropriate  Cardiopulmonary: warm and well perfused, breathing comfortably on room air  Abdomen: non-tender  Extremities: Symmetric      Labs in chart were reviewed.   Results from last 7 days   Lab Units 24   WBC AUTO x10*3/uL 8.1   HEMOGLOBIN g/dL 12.0   HEMATOCRIT % 35.9*   PLATELETS AUTO x10*3/uL 172   AST U/L 19   ALT U/L 14   CREATININE mg/dL 0.62        Prenatal labs reviewed, not remarkable.

## 2024-11-29 NOTE — ED TRIAGE NOTES
Pt had a 41 week vaginal delivery with an epidural. Pt is now having headache, stiff neck and back. No N/V, or chest pain

## 2024-11-29 NOTE — SIGNIFICANT EVENT
Note entry delayed due to patient care.    Patient was seen in OB triage for c/f post dural puncture headache. She had an uncomplicated vaginal delivery at [facility] on 11/26. Epidural placement on 11/26 was easy and no dural puncture was noted. She presents today with one day of severe headache and neck stiffness that is worse when sitting up or standing and improves with laying flat. She denies any photophobia or tinnitus. She has tried oral hydration, NSAIDs, tylenol and caffeine without improvement in symptoms. Her symptoms and onset are consistent with PDPH. We discussed the etiology of a dural puncture headache, including that it is a known complication of an epidural and that a dural puncture is not always immediately evident to the during or immediately after the procedure. We also discussed the typical natural course of a PDPH, expecting gradual improvement in symptoms and eventual resolution typically within one week of onset. We discussed the risks and benefits of an epidural blood patch (short term localized back pain, repeat dural puncture, return of headache and the exceedingly rare risk of seizure) versus continued conservative management. Initially, patient was electing to undergo blood patch, however, after further consideration, she decided to be discharged home with the plan for conservative management. OB team provided prescription for flexeril.     Discussed with Dr. Gallegos

## 2024-12-02 ENCOUNTER — APPOINTMENT (OUTPATIENT)
Dept: RADIOLOGY | Facility: CLINIC | Age: 29
End: 2024-12-02
Payer: COMMERCIAL

## 2025-01-13 ENCOUNTER — TELEPHONE (OUTPATIENT)
Dept: SURGICAL ONCOLOGY | Facility: CLINIC | Age: 30
End: 2025-01-13
Payer: COMMERCIAL

## 2025-01-16 ENCOUNTER — APPOINTMENT (OUTPATIENT)
Dept: OBSTETRICS AND GYNECOLOGY | Facility: CLINIC | Age: 30
End: 2025-01-16
Payer: COMMERCIAL

## 2025-01-16 VITALS
WEIGHT: 140 LBS | BODY MASS INDEX: 23.9 KG/M2 | SYSTOLIC BLOOD PRESSURE: 104 MMHG | DIASTOLIC BLOOD PRESSURE: 64 MMHG | HEIGHT: 64 IN

## 2025-01-16 DIAGNOSIS — Z30.011 ENCOUNTER FOR INITIAL PRESCRIPTION OF CONTRACEPTIVE PILLS: Primary | ICD-10-CM

## 2025-01-16 PROCEDURE — 0503F POSTPARTUM CARE VISIT: CPT | Performed by: OBSTETRICS & GYNECOLOGY

## 2025-01-16 RX ORDER — NORETHINDRONE 0.35 MG/1
1 TABLET ORAL DAILY
Qty: 84 TABLET | Refills: 0 | Status: SHIPPED | OUTPATIENT
Start: 2025-01-16 | End: 2026-01-16

## 2025-01-16 ASSESSMENT — EDINBURGH POSTNATAL DEPRESSION SCALE (EPDS)
I HAVE FELT SAD OR MISERABLE: NO, NOT AT ALL
I HAVE FELT SCARED OR PANICKY FOR NO GOOD REASON: NO, NOT AT ALL
TOTAL SCORE: 0
I HAVE BLAMED MYSELF UNNECESSARILY WHEN THINGS WENT WRONG: NO, NEVER
I HAVE BEEN SO UNHAPPY THAT I HAVE BEEN CRYING: NO, NEVER
I HAVE BEEN ABLE TO LAUGH AND SEE THE FUNNY SIDE OF THINGS: AS MUCH AS I ALWAYS COULD
I HAVE BEEN ANXIOUS OR WORRIED FOR NO GOOD REASON: NO, NOT AT ALL
I HAVE BEEN SO UNHAPPY THAT I HAVE HAD DIFFICULTY SLEEPING: NOT AT ALL
THINGS HAVE BEEN GETTING ON TOP OF ME: NO, I HAVE BEEN COPING AS WELL AS EVER
I HAVE LOOKED FORWARD WITH ENJOYMENT TO THINGS: AS MUCH AS I EVER DID
THE THOUGHT OF HARMING MYSELF HAS OCCURRED TO ME: NEVER

## 2025-01-16 ASSESSMENT — PAIN SCALES - GENERAL: PAINLEVEL_OUTOF10: 0-NO PAIN

## 2025-01-16 NOTE — PROGRESS NOTES
Subjective   Patient ID [id number]      Xiomara Joy is a 29 y.o.  who delivered at 41w0d by Vaginal, Spontaneous. She is here for her 6 week postpartum visit.   Her delivery was complicated by Fetal Intolerance [1] and  . Her postpartum course has been uncomplicated. She denies vaginal bleeding, and her bowel and bladder functioning is normal. She denies postpartum depression or blues with [place]  Depression Scale Total: 0. Baby is doing well and gaining weight appropriately. The patient is currently breastfeeding..    Objective   Weight: 63.5 kg (140 lb)  BP: 104/64  EGBUS normal  Vagina no lesions  Cervix no lesions    Assessment/Plan     Options for postpartum family planning were discussed. These included combination oral contraceptive options, progesterone only pills, implantable contraception, intrauterine devices, barrier methods, sterilization, and the rhythm method. The patient's questions were answered. She decided on POP as her preferred method of contraception.   Follow up: 3 mo, for annual well-woman care.

## 2025-01-17 NOTE — PROGRESS NOTES
Xiomara Joy female   1995 29 y.o.   [id number]             HPI  Xiomara Joy is a 29 y.o.  female [facility]. 3/2021 left breast ultrasound core biopsy at [facility] noted benign fibroadenoma.      BREAST IMAGIN2022 breast MRI, BI-RADS Category 2, bilateral masses consistent with fibroadenomas. 3/21/202 bilateral baseline mammogram, BI-RADS Category 2 bilateral probable benign breast masses.     REPRODUCTIVE HISTORY: menarche age 12, , first birth age 24,  premenopausal, extremely dense breast tissue,  one left benign biopsy with fibroadenoma    FAMILY CANCER HISTORY:   Paternal aunt (1): breast cancer  Paternal aunt (2): breast cancer  Paternal grandmother: breast cancer    REVIEW OF SYSTEMS    Constitutional:  Negative for appetite change, fatigue, fever and unexpected weight change.   HENT:  Negative for ear pain, hearing loss, nosebleeds, sore throat and trouble swallowing.    Eyes:  Negative for discharge, itching and visual disturbance.   Respiratory:  Negative for cough, chest tightness and shortness of breath.    Cardiovascular:  Negative for chest pain, palpitations and leg swelling.   Breast: as indicated in HPI  Gastrointestinal:  Negative for abdominal pain, constipation, diarrhea and nausea.   Endocrine: Negative for cold intolerance and heat intolerance.   Genitourinary:  Negative for dysuria, frequency, hematuria, pelvic pain and vaginal bleeding.   Musculoskeletal:  Negative for arthralgias, back pain, gait problem, joint swelling and myalgias.   Skin:  Negative for color change and rash.   Allergic/Immunologic: Negative for environmental allergies and food allergies.   Neurological:  Negative for dizziness, tremors, speech difficulty, weakness, numbness and headaches.   Hematological:  Does not bruise/bleed easily.   Psychiatric/Behavioral:  Negative for agitation, dysphoric mood and sleep  disturbance. The patient is not nervous/anxious.         MEDICATIONS  Current Outpatient Medications   Medication Instructions    multivitamin with minerals tablet 1 tablet, Daily    norethindrone (MICRONOR) 0.35 mg, oral, Daily        ALLERGIES  No Known Allergies     Past Medical History:   Diagnosis Date    Cholestasis during pregnancy     Masses of both breasts 01/19/2024      Past Surgical History:   Procedure Laterality Date    BREAST BIOPSY Left 02/26/2021    WISDOM TOOTH EXTRACTION        Family History   Problem Relation Name Age of Onset    No Known Problems Mother      No Known Problems Father      Breast cancer Father's Sister      Breast cancer Father's Sister      Breast cancer Paternal Grandmother            SOCIAL HISTORY      Social History     Tobacco Use    Smoking status: Never     Passive exposure: Never    Smokeless tobacco: Never   Substance Use Topics    Alcohol use: Not Currently     Comment: socially        VITALS  There were no vitals filed for this visit.       PHYSICAL EXAM  Patient is alert and oriented x3, with appropriate mood. The gait is steady and hand grasps are equal. Sclera clear. The breasts are nearly symmetrical. The tissue is soft with multiple palpable mobile soft round masses bilaterally.  Right breast at 12:00 3CFN 3cm 2.5cm, at 9:00 8cm FN 1x1cm, at 8:00 4cm FN 2x1cm. Left breast at 2:00 6CFN 1x1cm, 9:00 4cm FN 3x2.5cm. The skin and nipples appear normal. There is no cervical, supraclavicular, or axillary lymphadenopathy palpable. Heart rate and rhythm normal, S1 and S2 appreciated. The lungs are clear bilaterally. Abdomen is soft & non-tender.    Physical Exam  Chest:              IMAGING      Time was spent viewing digital images of the radiology testing with the patient. I explained the results in depth, along with suggested explanation for follow up recommendations based on the testing results.          ASSESSMENT/PLAN  Bilateral probable benign  fibroadenomas      Return age 40 for bilateral mammogram, talk to your family about about genetic testing for your father or aunts,      DARY Rasheed-[facility]

## 2025-01-20 ENCOUNTER — APPOINTMENT (OUTPATIENT)
Dept: SURGICAL ONCOLOGY | Facility: CLINIC | Age: 30
End: 2025-01-20
Payer: COMMERCIAL

## 2025-04-22 ENCOUNTER — APPOINTMENT (OUTPATIENT)
Dept: OBSTETRICS AND GYNECOLOGY | Facility: CLINIC | Age: 30
End: 2025-04-22
Payer: COMMERCIAL

## 2025-04-22 VITALS
HEIGHT: 64 IN | SYSTOLIC BLOOD PRESSURE: 102 MMHG | WEIGHT: 148 LBS | BODY MASS INDEX: 25.27 KG/M2 | DIASTOLIC BLOOD PRESSURE: 64 MMHG

## 2025-04-22 DIAGNOSIS — Z01.419 WELL WOMAN EXAM: ICD-10-CM

## 2025-04-22 PROCEDURE — 99395 PREV VISIT EST AGE 18-39: CPT | Performed by: OBSTETRICS & GYNECOLOGY

## 2025-04-22 PROCEDURE — 87624 HPV HI-RISK TYP POOLED RSLT: CPT

## 2025-04-22 PROCEDURE — 3008F BODY MASS INDEX DOCD: CPT | Performed by: OBSTETRICS & GYNECOLOGY

## 2025-04-22 PROCEDURE — 88175 CYTOPATH C/V AUTO FLUID REDO: CPT

## 2025-04-22 PROCEDURE — 1036F TOBACCO NON-USER: CPT | Performed by: OBSTETRICS & GYNECOLOGY

## 2025-04-22 ASSESSMENT — PAIN SCALES - GENERAL: PAINLEVEL_OUTOF10: 0-NO PAIN

## 2025-04-22 NOTE — PROGRESS NOTES
"Xiomara Joy is a 30 y.o. female who is here for a routine exam. PCP = Ginny Davenport MD  Breast-feeding going well    Menses : None with breast-feeding  Contraception : none  HPV vaccine : No  Last pap : 2024 normal  Last HPV : Never  History of abnormal pap : No  Last mammogram : Never  History of abnormal mammogram : No  Colon cancer screen : Never    ROS  systems reviewed, anything negative noted in HPI    bladder: no dysuria, gross hematuria, urinary frequency, urgency or incontinence  breast: no lumps, nipple d/c, overlying skin changes, redness, or skin retraction    [unfilled]    Past med hx and past surg hx reviewed and notable for:     Objective   /64   Ht 1.626 m (5' 4\")   Wt 67.1 kg (148 lb)   Breastfeeding Yes   BMI 25.40 kg/m²      General:   Alert and oriented, in no acute distress   Neck: Supple. No visible thyromegaly.    Breast/Axilla: Normal to palpation bilaterally without masses, skin changes, lymphadenopathy, or nipple discharge.    Abdomen: Soft, non-tender, without masses or organomegaly   Vulva:  Urethra: Normal architecture without erythema, masses, or lesions.   Normal    Vagina: Normal mucosa without lesions, masses, or atrophy. No abnormal vaginal discharge.    Cervix: Normal without masses, lesions, or signs of cervicitis.    Uterus: Normal mobile, non-enlarged uterus    Adnexa: Normal without masses or lesions   Pelvic Floor: No POP noted.    Psych:  Anus/Perineum: Normal affect. Normal mood.   Normal without masses or lesions      Thank you for coming to your annual exam. Your findings during the exam were normal. Specific topics addressed during this exam included: healthy lifestyle, well woman screening guidelines,     Actions performed during this visit include:  - Clinical breast exam  - Clinical pelvic exam  - pap/hpv  No orders of the defined types were placed in this encounter.          Please return for your next visit in 1 year.  "

## 2025-05-01 LAB
CYTOLOGY CMNT CVX/VAG CYTO-IMP: NORMAL
HPV HR 12 DNA GENITAL QL NAA+PROBE: NEGATIVE
HPV HR GENOTYPES PNL CVX NAA+PROBE: NEGATIVE
HPV16 DNA SPEC QL NAA+PROBE: NEGATIVE
HPV18 DNA SPEC QL NAA+PROBE: NEGATIVE
LAB AP HPV GENOTYPE QUESTION: YES
LAB AP HPV HR: NORMAL
LABORATORY COMMENT REPORT: NORMAL
PATH REPORT.TOTAL CANCER: NORMAL

## 2025-06-30 ENCOUNTER — APPOINTMENT (OUTPATIENT)
Dept: PRIMARY CARE | Facility: CLINIC | Age: 30
End: 2025-06-30
Payer: COMMERCIAL

## 2026-04-28 ENCOUNTER — APPOINTMENT (OUTPATIENT)
Dept: OBSTETRICS AND GYNECOLOGY | Facility: CLINIC | Age: 31
End: 2026-04-28
Payer: COMMERCIAL